# Patient Record
Sex: FEMALE | Race: BLACK OR AFRICAN AMERICAN | Employment: FULL TIME | URBAN - METROPOLITAN AREA
[De-identification: names, ages, dates, MRNs, and addresses within clinical notes are randomized per-mention and may not be internally consistent; named-entity substitution may affect disease eponyms.]

---

## 2019-01-26 ENCOUNTER — HOSPITAL ENCOUNTER (EMERGENCY)
Age: 24
Discharge: HOME | End: 2019-01-26
Attending: EMERGENCY MEDICINE
Payer: COMMERCIAL

## 2019-01-26 VITALS
TEMPERATURE: 99 F | RESPIRATION RATE: 16 BRPM | HEART RATE: 105 BPM | OXYGEN SATURATION: 100 % | HEIGHT: 66 IN | WEIGHT: 200 LBS | BODY MASS INDEX: 32.14 KG/M2 | SYSTOLIC BLOOD PRESSURE: 131 MMHG | DIASTOLIC BLOOD PRESSURE: 67 MMHG

## 2019-01-26 DIAGNOSIS — J03.90 ACUTE TONSILLITIS, UNSPECIFIED ETIOLOGY: Primary | ICD-10-CM

## 2019-01-26 LAB — S PYO AG THROAT QL: NEGATIVE

## 2019-01-26 PROCEDURE — 87880 STREP A ASSAY W/OPTIC: CPT

## 2019-01-26 PROCEDURE — 87081 CULTURE SCREEN ONLY: CPT

## 2019-01-26 PROCEDURE — 99282 EMERGENCY DEPT VISIT SF MDM: CPT

## 2019-01-26 RX ORDER — CEPHALEXIN 500 MG/1
500 CAPSULE ORAL 4 TIMES DAILY
Qty: 40 CAPSULE | Refills: 0 | Status: SHIPPED | OUTPATIENT
Start: 2019-01-26 | End: 2019-02-05

## 2019-01-29 LAB — S PYO THROAT QL CULT: NORMAL

## 2020-12-31 ENCOUNTER — OFFICE VISIT (OUTPATIENT)
Dept: PRIMARY CARE CLINIC | Age: 25
End: 2020-12-31
Payer: COMMERCIAL

## 2020-12-31 VITALS
OXYGEN SATURATION: 98 % | WEIGHT: 214.4 LBS | HEIGHT: 67 IN | DIASTOLIC BLOOD PRESSURE: 80 MMHG | TEMPERATURE: 97.8 F | HEART RATE: 90 BPM | BODY MASS INDEX: 33.65 KG/M2 | SYSTOLIC BLOOD PRESSURE: 120 MMHG

## 2020-12-31 DIAGNOSIS — Z00.00 ENCOUNTER FOR ANNUAL PHYSICAL EXAM: ICD-10-CM

## 2020-12-31 LAB
BILIRUBIN, POC: NEGATIVE
BLOOD URINE, POC: NEGATIVE
CLARITY, POC: NORMAL
COLOR, POC: YELLOW
CONTROL: NORMAL
GLUCOSE URINE, POC: NEGATIVE
KETONES, POC: NORMAL
LEUKOCYTE EST, POC: NEGATIVE
NITRITE, POC: NEGATIVE
PH, POC: 5.5
PREGNANCY TEST URINE, POC: NEGATIVE
PROTEIN, POC: NORMAL
SPECIFIC GRAVITY, POC: 1
UROBILINOGEN, POC: 0.2

## 2020-12-31 PROCEDURE — 99385 PREV VISIT NEW AGE 18-39: CPT | Performed by: STUDENT IN AN ORGANIZED HEALTH CARE EDUCATION/TRAINING PROGRAM

## 2020-12-31 PROCEDURE — 81025 URINE PREGNANCY TEST: CPT | Performed by: STUDENT IN AN ORGANIZED HEALTH CARE EDUCATION/TRAINING PROGRAM

## 2020-12-31 PROCEDURE — 81002 URINALYSIS NONAUTO W/O SCOPE: CPT | Performed by: STUDENT IN AN ORGANIZED HEALTH CARE EDUCATION/TRAINING PROGRAM

## 2020-12-31 SDOH — HEALTH STABILITY: MENTAL HEALTH: HOW OFTEN DO YOU HAVE A DRINK CONTAINING ALCOHOL?: NOT ASKED

## 2020-12-31 SDOH — ECONOMIC STABILITY: INCOME INSECURITY: HOW HARD IS IT FOR YOU TO PAY FOR THE VERY BASICS LIKE FOOD, HOUSING, MEDICAL CARE, AND HEATING?: NOT HARD AT ALL

## 2020-12-31 SDOH — ECONOMIC STABILITY: TRANSPORTATION INSECURITY
IN THE PAST 12 MONTHS, HAS THE LACK OF TRANSPORTATION KEPT YOU FROM MEDICAL APPOINTMENTS OR FROM GETTING MEDICATIONS?: NO

## 2020-12-31 SDOH — ECONOMIC STABILITY: TRANSPORTATION INSECURITY
IN THE PAST 12 MONTHS, HAS LACK OF TRANSPORTATION KEPT YOU FROM MEETINGS, WORK, OR FROM GETTING THINGS NEEDED FOR DAILY LIVING?: NO

## 2020-12-31 SDOH — ECONOMIC STABILITY: FOOD INSECURITY: WITHIN THE PAST 12 MONTHS, THE FOOD YOU BOUGHT JUST DIDN'T LAST AND YOU DIDN'T HAVE MONEY TO GET MORE.: NEVER TRUE

## 2020-12-31 SDOH — HEALTH STABILITY: MENTAL HEALTH: HOW MANY STANDARD DRINKS CONTAINING ALCOHOL DO YOU HAVE ON A TYPICAL DAY?: NOT ASKED

## 2020-12-31 SDOH — ECONOMIC STABILITY: FOOD INSECURITY: WITHIN THE PAST 12 MONTHS, YOU WORRIED THAT YOUR FOOD WOULD RUN OUT BEFORE YOU GOT MONEY TO BUY MORE.: NEVER TRUE

## 2020-12-31 ASSESSMENT — PATIENT HEALTH QUESTIONNAIRE - PHQ9
SUM OF ALL RESPONSES TO PHQ QUESTIONS 1-9: 0
1. LITTLE INTEREST OR PLEASURE IN DOING THINGS: 0
SUM OF ALL RESPONSES TO PHQ QUESTIONS 1-9: 0
2. FEELING DOWN, DEPRESSED OR HOPELESS: 0
SUM OF ALL RESPONSES TO PHQ QUESTIONS 1-9: 0
SUM OF ALL RESPONSES TO PHQ9 QUESTIONS 1 & 2: 0

## 2020-12-31 NOTE — PROGRESS NOTES
2020    Kriss Perez (:  1995) is a 31 y.o. female, here for evaluation of the following medical concerns:    HPI    Abdominal pain x 3 months  30 minutes after eating  No bloating, no gas  2 BM normal daily  No vomiting  Has history of endometriosis    Has been feeling abdominal pain for about a year ago like she had before with the endometriosis. Normal BM's, having some urinary frequency, no abnormal vaginal, no hematuria, fevers, no abnormal pap smear in past, no discharge. Social Hx:  no tobacco/alcohol/drugs  Sexually active: men no condoms  Occupation: car parts   Fam Hx: none  Past Surgical Hx:  Other PMH: endometriosis, 2 years ago 2018  Menses: irregular, will have some spotting in between periods lasting 3 days, no pain or heavy bleeding  Contraception: no   Pap Hx: Last pap: no abnormals per pt    LMP 12/10    Review of Systems   Constitutional: Negative for chills and fever. HENT: Negative for congestion, rhinorrhea and sore throat. Eyes: Negative for visual disturbance. Respiratory: Negative for cough and shortness of breath. Cardiovascular: Negative for chest pain. Gastrointestinal: Negative for constipation, diarrhea, nausea and vomiting. Endocrine: Negative for polydipsia and polyuria. Genitourinary: Negative for dysuria. Musculoskeletal: Negative for arthralgias. Skin: Negative for rash. Allergic/Immunologic: Negative for environmental allergies. Neurological: Negative for headaches. Psychiatric/Behavioral: The patient is not nervous/anxious. Prior to Visit Medications    Not on File        No Known Allergies    No past medical history on file. No past surgical history on file.     Social History     Socioeconomic History    Marital status: Single     Spouse name: Not on file    Number of children: Not on file    Years of education: Not on file    Highest education level: Not on file   Occupational History    Not on normal. No rhinorrhea. Mouth/Throat:      Mouth: Mucous membranes are moist.      Pharynx: Oropharynx is clear. No oropharyngeal exudate. Eyes:      General: No scleral icterus. Right eye: No discharge. Left eye: No discharge. Extraocular Movements: Extraocular movements intact. Conjunctiva/sclera: Conjunctivae normal.   Neck:      Musculoskeletal: Neck supple. No muscular tenderness. Cardiovascular:      Rate and Rhythm: Normal rate and regular rhythm. Pulses: Normal pulses. Heart sounds: Normal heart sounds. No murmur. No gallop. Pulmonary:      Effort: Pulmonary effort is normal.      Breath sounds: Normal breath sounds. No wheezing, rhonchi or rales. Abdominal:      General: Abdomen is flat. Bowel sounds are normal. There is no distension. Palpations: Abdomen is soft. There is no mass. Comments: Moderate tenderness palpation periumbilical region, no guarding, no rebound   Genitourinary:     General: Normal vulva. Vagina: Normal.      Cervix: Normal.      Uterus: Normal.       Adnexa: Right adnexa normal and left adnexa normal.   Musculoskeletal: Normal range of motion. Lymphadenopathy:      Cervical: No cervical adenopathy. Skin:     General: Skin is warm. Capillary Refill: Capillary refill takes less than 2 seconds. Findings: No rash. Neurological:      General: No focal deficit present. Mental Status: She is alert. Cranial Nerves: No cranial nerve deficit. Psychiatric:         Mood and Affect: Mood normal.         Behavior: Behavior normal.         ASSESSMENT/PLAN:    Get CT scan of her abdomen since she is has moderate periumbilical pain, unsure if this is an exacerbation of her endometriosis? Will send to gynecology for evaluation. 1. Encounter for annual physical exam  - HEPATITIS C ANTIBODY; Future  - HIV-1 AND HIV-2 ANTIBODIES; Future  - PAP SMEAR  - COMPREHENSIVE METABOLIC PANEL;  Future  - C.trachomatis N.gonorrhoeae DNA, Urine; Future  - C.trachomatis N.gonorrhoeae DNA, Urine    2. Left inguinal pain  - VAGINAL PATHOGENS PROBE *A  - POCT Urinalysis no Micro  - [facility]    3. Hx of endometriosis  - [facility]    4. Periumbilical abdominal pain  - CT ABDOMEN PELVIS W IV CONTRAST Additional Contrast? None; Future  - POCT urine pregnancy      Return if symptoms worsen or fail to improve. An  electronic signature was used to authenticate this note.     --Mildred Castleman, MD on 12/31/2020 at 6:12 PM

## 2021-01-01 LAB
A/G RATIO: 1.7 (ref 1.1–2.2)
ALBUMIN SERPL-MCNC: 4.7 G/DL (ref 3.4–5)
ALP BLD-CCNC: 73 U/L (ref 40–129)
ALT SERPL-CCNC: 16 U/L (ref 10–40)
ANION GAP SERPL CALCULATED.3IONS-SCNC: 13 MMOL/L (ref 3–16)
AST SERPL-CCNC: 15 U/L (ref 15–37)
BILIRUB SERPL-MCNC: <0.2 MG/DL (ref 0–1)
BUN BLDV-MCNC: 15 MG/DL (ref 7–20)
CALCIUM SERPL-MCNC: 9.8 MG/DL (ref 8.3–10.6)
CANDIDA SPECIES, DNA PROBE: ABNORMAL
CHLORIDE BLD-SCNC: 103 MMOL/L (ref 99–110)
CO2: 24 MMOL/L (ref 21–32)
CREAT SERPL-MCNC: 0.6 MG/DL (ref 0.6–1.1)
GARDNERELLA VAGINALIS, DNA PROBE: ABNORMAL
GFR AFRICAN AMERICAN: >60
GFR NON-AFRICAN AMERICAN: >60
GLOBULIN: 2.8 G/DL
GLUCOSE BLD-MCNC: 65 MG/DL (ref 70–99)
HEPATITIS C ANTIBODY INTERPRETATION: NORMAL
HIV AG/AB: NORMAL
HIV ANTIGEN: NORMAL
HIV-1 ANTIBODY: NORMAL
HIV-2 AB: NORMAL
POTASSIUM SERPL-SCNC: 5.5 MMOL/L (ref 3.5–5.1)
SODIUM BLD-SCNC: 140 MMOL/L (ref 136–145)
TOTAL PROTEIN: 7.5 G/DL (ref 6.4–8.2)
TRICHOMONAS VAGINALIS DNA: ABNORMAL

## 2021-01-01 RX ORDER — METRONIDAZOLE 500 MG/1
500 TABLET ORAL 2 TIMES DAILY
Qty: 14 TABLET | Refills: 0 | Status: SHIPPED | OUTPATIENT
Start: 2021-01-01 | End: 2021-01-08

## 2021-01-01 NOTE — RESULT ENCOUNTER NOTE
Affirm positive for bacterial vaginosis. This is not an STD but can happen when their is a disruption in the pH of the vagina allowing the bad bacteria to overpower the good bacteria. Avoiding things like scented bath soaps, scented tampons, vaginal douche, and maintain good hygiene especially after intercourse, will help. It should be treated with antibiotics because it can increase your risk for acquiring STDs. I will send medicine to your pharmacy. Any questions, please feel free to message or call me back.     Sincerely,   Dr Biju Henriquez

## 2021-01-03 LAB
C. TRACHOMATIS DNA ,URINE: NEGATIVE
N. GONORRHOEAE DNA, URINE: NEGATIVE

## 2021-01-13 ENCOUNTER — TELEPHONE (OUTPATIENT)
Dept: PRIMARY CARE CLINIC | Age: 26
End: 2021-01-13

## 2021-01-13 ENCOUNTER — HOSPITAL ENCOUNTER (OUTPATIENT)
Dept: CT IMAGING | Age: 26
Discharge: HOME | End: 2021-01-13
Payer: COMMERCIAL

## 2021-01-13 DIAGNOSIS — R10.33 PERIUMBILICAL ABDOMINAL PAIN: ICD-10-CM

## 2021-01-13 PROCEDURE — 6360000004 HC RX CONTRAST MEDICATION: Performed by: STUDENT IN AN ORGANIZED HEALTH CARE EDUCATION/TRAINING PROGRAM

## 2021-01-13 PROCEDURE — 74177 CT ABD & PELVIS W/CONTRAST: CPT

## 2021-01-13 RX ADMIN — IOPAMIDOL 75 ML: 755 INJECTION, SOLUTION INTRAVENOUS at 14:56

## 2021-01-14 ENCOUNTER — OFFICE VISIT (OUTPATIENT)
Dept: GYNECOLOGY | Age: 26
End: 2021-01-14
Payer: COMMERCIAL

## 2021-01-14 VITALS
SYSTOLIC BLOOD PRESSURE: 115 MMHG | HEART RATE: 76 BPM | TEMPERATURE: 97.1 F | WEIGHT: 217 LBS | BODY MASS INDEX: 33.79 KG/M2 | DIASTOLIC BLOOD PRESSURE: 75 MMHG

## 2021-01-14 DIAGNOSIS — G89.29 CHRONIC PELVIC PAIN IN FEMALE: ICD-10-CM

## 2021-01-14 DIAGNOSIS — N80.9 ENDOMETRIOSIS: Primary | ICD-10-CM

## 2021-01-14 DIAGNOSIS — R10.2 CHRONIC PELVIC PAIN IN FEMALE: ICD-10-CM

## 2021-01-14 DIAGNOSIS — D21.9 FIBROID: ICD-10-CM

## 2021-01-14 PROCEDURE — 99203 OFFICE O/P NEW LOW 30 MIN: CPT | Performed by: OBSTETRICS & GYNECOLOGY

## 2021-01-14 RX ORDER — LEUPROLIDE ACETATE 3.75 MG
3.75 KIT INTRAMUSCULAR ONCE
Qty: 1 KIT | Refills: 0 | Status: SHIPPED | OUTPATIENT
Start: 2021-01-14 | End: 2022-10-05 | Stop reason: ALTCHOICE

## 2021-01-14 ASSESSMENT — ENCOUNTER SYMPTOMS
DIARRHEA: 0
ABDOMINAL PAIN: 0
CHEST TIGHTNESS: 0
CONSTIPATION: 0
BLOOD IN STOOL: 0
SHORTNESS OF BREATH: 0
SORE THROAT: 0
NAUSEA: 0
APNEA: 0
WHEEZING: 0
TROUBLE SWALLOWING: 0
BACK PAIN: 0
COUGH: 0
ABDOMINAL DISTENTION: 0
PHOTOPHOBIA: 0
COLOR CHANGE: 0
ANAL BLEEDING: 0
RECTAL PAIN: 0
VOMITING: 0

## 2021-01-14 NOTE — PROGRESS NOTES
Annual GYN Visit    Kashif Casey  Date ofBirth:  1995    Date of Service:  2021    Chief Complaint:   Kashif Casey is a 31 y.o.  ab1  female who presents for history of endometriosis, chronic pelvic pain x 2 years and fibroids     HPI:  Patient is premenopausal- she had a DIAGNOSTIC LAPAROSCOPY WITH LASER vaporization of endometrial implants, DILATION AND CURETTAGE, MYOSURE, HYSTEROSCOPY COLPOSCOPY WITH CERVICAL BIOPSY; chromo pertubation at [facility] in 2018 and noted to have ZAINAB 1, endometriosis, endometrial polyp and patent left fallopian tube and non patent right fallopian tube. She is here for chronic pelvic pain -pain is mainly in the left lower abdomen and worse right before and after menses - typically symptoms last for 2 weeks in a month, she took oral contraception last year for 2 months for endometriosis but it did not alleviate symptoms and she had issues with compliance. Patient is in a monogamous relationship x 3 year, had std screens  2020 - negative. She has a pap smear done on 2020 - normal . She wants to try to conceive in one year. She had a CT scan abdomen and pelvis done 2021 ->   FINDINGS:   Lower Chest: There is no consolidation or effusion.       Organs: The liver, pancreas, spleen, kidneys and adrenals are unremarkable.       GI/Bowel: There is no bowel dilatation, wall thickening or obstruction.  The   appendix is normal.       Pelvis: The bladder is decompressed and thus not evaluated.  A small   intramural fibroid is noted within the posterior uterine fundus, exhibiting   diameter of 1 cm.       Peritoneum/Retroperitoneum: There is no free air, free fluid or   intraperitoneal inflammatory change.  There is no adenopathy.       Bones/Soft Tissues: There is no acute fracture or aggressive osseous lesion.           Impression   Small uterine fibroid, otherwise negative study.      Menses are regular with normal flow and last 3 days since surgery, previously 10 days   Health Maintenance   Topic Date Due    Varicella vaccine (1 of 2 - 2-dose childhood series) 04/20/1995    HPV vaccine (1 - 2-dose series) 04/20/2005    DTaP/Tdap/Td vaccine (1 - Tdap) 04/20/2013    Flu vaccine (1) 12/31/2021 (Originally 9/1/2020)    Cervical cancer screen  12/31/2023    Hepatitis C screen  Completed    HIV screen  Completed    Hepatitis A vaccine  Aged Out    Hepatitis B vaccine  Aged Out    Hib vaccine  Aged Out    Meningococcal (ACWY) vaccine  Aged Out    Pneumococcal 0-64 years Vaccine  Aged Out       No past medical history on file. No past surgical history on file. OB History   No obstetric history on file. Social History     Socioeconomic History    Marital status: Single     Spouse name: Not on file    Number of children: Not on file    Years of education: Not on file    Highest education level: Not on file   Occupational History    Not on file   Social Needs    Financial resource strain: Not hard at all    Food insecurity     Worry: Never true     Inability: Never true   [employer] needs     Medical: No     Non-medical: No   Tobacco Use    Smoking status: Never Smoker    Smokeless tobacco: Never Used   Substance and Sexual Activity    Alcohol use:  Yes    Drug use: No    Sexual activity: Not on file   Lifestyle    Physical activity     Days per week: Not on file     Minutes per session: Not on file    Stress: Not on file   Relationships    Social connections     Talks on phone: Not on file     Gets together: Not on file     Attends Religion service: Not on file     Active member of club or organization: Not on file     Attends meetings of clubs or organizations: Not on file     Relationship status: Not on file    Intimate partner violence     Fear of current or ex partner: Not on file     Emotionally abused: Not on file     Physically abused: Not on file     Forced sexual activity: Not on file   Other Topics Concern    Not on file   Social History Narrative    Not on file     No Known Allergies  Outpatient Medications Marked as Taking for the 1/14/21 encounter (Office Visit) with Chris Patterson MD   Medication Sig Dispense Refill    leuprolide (LUPRON DEPOT, 1-MONTH,) 3.75 MG injection Inject 3.75 mg into the muscle once for 1 dose Bring medication to doctor office for administration 1 kit 0     No family history on file. Review of Systems:  Review of Systems   Constitutional: Negative for appetite change, chills, fatigue, fever and unexpected weight change. HENT: Negative for ear pain, hearing loss, mouth sores, sore throat and trouble swallowing. Eyes: Negative for photophobia and visual disturbance. Respiratory: Negative for apnea, cough, chest tightness, shortness of breath and wheezing. Cardiovascular: Negative for chest pain, palpitations and leg swelling. Gastrointestinal: Negative for abdominal distention, abdominal pain, anal bleeding, blood in stool, constipation, diarrhea, nausea, rectal pain and vomiting. Endocrine: Negative for cold intolerance, heat intolerance, polydipsia, polyphagia and polyuria. Genitourinary: Positive for pelvic pain. Negative for difficulty urinating, dyspareunia, dysuria, enuresis, frequency, genital sores, hematuria, menstrual problem, urgency, vaginal bleeding, vaginal discharge and vaginal pain. Musculoskeletal: Negative for arthralgias, back pain, joint swelling and myalgias. Skin: Negative for color change and rash. Neurological: Negative for dizziness, seizures, syncope, light-headedness and headaches. Psychiatric/Behavioral: Negative for agitation, behavioral problems, confusion, decreased concentration, dysphoric mood, hallucinations, self-injury, sleep disturbance and suicidal ideas. The patient is not nervous/anxious and is not hyperactive.         Physical Exam:  /75   Pulse 76   Temp 97.1 °F (36.2 °C)   Wt 217 lb (98.4 kg) BMI 33.79 kg/m²   BP Readings from Last 3 Encounters:   01/14/21 115/75   12/31/20 120/80   01/26/19 131/67     Body mass index is 33.79 kg/m². Physical Exam  Constitutional:       General: She is not in acute distress. Appearance: She is well-developed. HENT:      Head: Normocephalic and atraumatic. Mouth/Throat:      Pharynx: No oropharyngeal exudate. Eyes:      General: No scleral icterus. Right eye: No discharge. Left eye: No discharge. Conjunctiva/sclera: Conjunctivae normal.   Neck:      Thyroid: No thyromegaly. Cardiovascular:      Rate and Rhythm: Normal rate and regular rhythm. Heart sounds: Normal heart sounds. Pulmonary:      Effort: Pulmonary effort is normal.      Breath sounds: Normal breath sounds. Abdominal:      General: Bowel sounds are normal. There is no distension. Palpations: Abdomen is soft. There is no mass. Tenderness: There is no abdominal tenderness. There is no guarding or rebound. Genitourinary:     Labia:         Right: No rash, tenderness, lesion or injury. Left: No rash, tenderness, lesion or injury. Vagina: Normal. No signs of injury and foreign body. No vaginal discharge, erythema or tenderness. Cervix: No cervical motion tenderness, discharge or friability. Uterus: Not deviated, not enlarged, not fixed and not tender. Adnexa:         Right: No mass, tenderness or fullness. Left: No mass, tenderness or fullness. Rectum: No mass or external hemorrhoid. Skin:     General: Skin is warm. Coloration: Skin is not pale. Findings: No erythema or rash. Neurological:      Mental Status: She is alert. Psychiatric:         Behavior: Behavior normal. Behavior is cooperative. Thought Content: Thought content normal.         Judgment: Judgment normal.           Assessment/Plan:  1.  Endometriosis  Discussed all treatment options including medical and surgical treatments - she wants to try lupron - risks, benefits and alternative to lupron discussed including side-effects   - US PELVIS COMPLETE; Future  - leuprolide (LUPRON DEPOT, 1-MONTH,) 3.75 MG injection; Inject 3.75 mg into the muscle once for 1 dose Bring medication to doctor office for administration  Dispense: 1 kit; Refill: 0    2. Fibroid  - US PELVIS COMPLETE; Future    3. Chronic pelvic pain in female  Secondary to endometriosis   - US PELVIS COMPLETE; Future      Return in about 1 week (around 1/21/2021).

## 2021-01-15 ENCOUNTER — HOSPITAL ENCOUNTER (OUTPATIENT)
Dept: ULTRASOUND IMAGING | Age: 26
Discharge: HOME | End: 2021-01-15
Payer: COMMERCIAL

## 2021-01-15 DIAGNOSIS — G89.29 CHRONIC PELVIC PAIN IN FEMALE: ICD-10-CM

## 2021-01-15 DIAGNOSIS — D21.9 FIBROID: ICD-10-CM

## 2021-01-15 DIAGNOSIS — N80.9 ENDOMETRIOSIS: ICD-10-CM

## 2021-01-15 DIAGNOSIS — R10.2 CHRONIC PELVIC PAIN IN FEMALE: ICD-10-CM

## 2021-01-15 PROCEDURE — 76856 US EXAM PELVIC COMPLETE: CPT

## 2021-01-26 ENCOUNTER — PATIENT MESSAGE (OUTPATIENT)
Dept: GYNECOLOGY | Age: 26
End: 2021-01-26

## 2021-01-28 NOTE — TELEPHONE ENCOUNTER
Our department does not do PA's for your offfice. I am unsure how you got this information. We only do PCP; not specialty.

## 2021-01-29 ENCOUNTER — TELEPHONE (OUTPATIENT)
Dept: GYNECOLOGY | Age: 26
End: 2021-01-29

## 2021-01-29 NOTE — TELEPHONE ENCOUNTER
Please do PA FOR LUPRON for treatment of endometriosis and notify patient with an update.  Thank you

## 2021-01-29 NOTE — TELEPHONE ENCOUNTER
Reference # [id number]  P. A was done through medical insurance approval pending review notification will be sent via fax to [phone] Dr. Brooklyn Hamilton office.

## 2021-02-22 ENCOUNTER — TELEPHONE (OUTPATIENT)
Dept: GYNECOLOGY | Age: 26
End: 2021-02-22

## 2021-02-22 NOTE — TELEPHONE ENCOUNTER
Spoke to patient she was notified that depot was approved and she could bring it to office and make appt

## 2022-05-16 LAB
ABO, EXTERNAL RESULT: NORMAL
HEP B, EXTERNAL RESULT: NEGATIVE
HEPATITIS C ANTIBODY, EXTERNAL RESULT: NEGATIVE
HIV, EXTERNAL RESULT: NON REACTIVE
RH FACTOR, EXTERNAL RESULT: POSITIVE
RPR, EXTERNAL RESULT: NORMAL
RUBELLA TITER, EXTERNAL RESULT: NORMAL

## 2022-08-05 ENCOUNTER — HOSPITAL ENCOUNTER (EMERGENCY)
Age: 27
Discharge: HOME | End: 2022-08-06
Payer: MEDICAID

## 2022-08-05 VITALS
SYSTOLIC BLOOD PRESSURE: 149 MMHG | BODY MASS INDEX: 37.67 KG/M2 | HEART RATE: 96 BPM | WEIGHT: 240 LBS | OXYGEN SATURATION: 100 % | TEMPERATURE: 98.7 F | RESPIRATION RATE: 20 BRPM | HEIGHT: 67 IN | DIASTOLIC BLOOD PRESSURE: 68 MMHG

## 2022-08-05 DIAGNOSIS — N30.00 ACUTE CYSTITIS WITHOUT HEMATURIA: ICD-10-CM

## 2022-08-05 DIAGNOSIS — O26.892 ABDOMINAL PAIN IN PREGNANCY, SECOND TRIMESTER: Primary | ICD-10-CM

## 2022-08-05 DIAGNOSIS — R10.9 ABDOMINAL PAIN IN PREGNANCY, SECOND TRIMESTER: Primary | ICD-10-CM

## 2022-08-05 PROCEDURE — 99283 EMERGENCY DEPT VISIT LOW MDM: CPT

## 2022-08-05 RX ORDER — ACETAMINOPHEN 500 MG
1000 TABLET ORAL ONCE
Status: COMPLETED | OUTPATIENT
Start: 2022-08-06 | End: 2022-08-06

## 2022-08-06 LAB
A/G RATIO: 1.2 (ref 1.1–2.2)
ALBUMIN SERPL-MCNC: 3.6 G/DL (ref 3.4–5)
ALP BLD-CCNC: 76 U/L (ref 40–129)
ALT SERPL-CCNC: 27 U/L (ref 10–40)
ANION GAP SERPL CALCULATED.3IONS-SCNC: 8 MMOL/L (ref 3–16)
AST SERPL-CCNC: 17 U/L (ref 15–37)
BACTERIA: ABNORMAL /HPF
BASOPHILS ABSOLUTE: 0.1 K/UL (ref 0–0.2)
BASOPHILS RELATIVE PERCENT: 0.4 %
BILIRUB SERPL-MCNC: <0.2 MG/DL (ref 0–1)
BILIRUBIN URINE: NEGATIVE
BLOOD, URINE: NEGATIVE
BUN BLDV-MCNC: 6 MG/DL (ref 7–20)
CALCIUM SERPL-MCNC: 9.4 MG/DL (ref 8.3–10.6)
CHLORIDE BLD-SCNC: 104 MMOL/L (ref 99–110)
CLARITY: ABNORMAL
CO2: 23 MMOL/L (ref 21–32)
COLOR: YELLOW
CREAT SERPL-MCNC: 0.5 MG/DL (ref 0.6–1.1)
EOSINOPHILS ABSOLUTE: 0.1 K/UL (ref 0–0.6)
EOSINOPHILS RELATIVE PERCENT: 0.7 %
EPITHELIAL CELLS, UA: 15 /HPF (ref 0–5)
GFR AFRICAN AMERICAN: >60
GFR NON-AFRICAN AMERICAN: >60
GLUCOSE BLD-MCNC: 114 MG/DL (ref 70–99)
GLUCOSE URINE: 500 MG/DL
HCT VFR BLD CALC: 33.8 % (ref 36–48)
HEMOGLOBIN: 11 G/DL (ref 12–16)
HYALINE CASTS: 0 /LPF (ref 0–8)
KETONES, URINE: NEGATIVE MG/DL
LEUKOCYTE ESTERASE, URINE: ABNORMAL
LYMPHOCYTES ABSOLUTE: 2.1 K/UL (ref 1–5.1)
LYMPHOCYTES RELATIVE PERCENT: 17.5 %
MCH RBC QN AUTO: 27.1 PG (ref 26–34)
MCHC RBC AUTO-ENTMCNC: 32.5 G/DL (ref 31–36)
MCV RBC AUTO: 83.5 FL (ref 80–100)
MICROSCOPIC EXAMINATION: YES
MONOCYTES ABSOLUTE: 1 K/UL (ref 0–1.3)
MONOCYTES RELATIVE PERCENT: 8.3 %
NEUTROPHILS ABSOLUTE: 8.8 K/UL (ref 1.7–7.7)
NEUTROPHILS RELATIVE PERCENT: 73.1 %
NITRITE, URINE: NEGATIVE
PDW BLD-RTO: 17.2 % (ref 12.4–15.4)
PH UA: 7.5 (ref 5–8)
PLATELET # BLD: 327 K/UL (ref 135–450)
PMV BLD AUTO: 7.3 FL (ref 5–10.5)
POTASSIUM SERPL-SCNC: 3.7 MMOL/L (ref 3.5–5.1)
PROTEIN UA: NEGATIVE MG/DL
RBC # BLD: 4.05 M/UL (ref 4–5.2)
RBC UA: 0 /HPF (ref 0–4)
SODIUM BLD-SCNC: 135 MMOL/L (ref 136–145)
SPECIFIC GRAVITY UA: 1.01 (ref 1–1.03)
TOTAL PROTEIN: 6.7 G/DL (ref 6.4–8.2)
URINE REFLEX TO CULTURE: YES
URINE TYPE: ABNORMAL
UROBILINOGEN, URINE: 0.2 E.U./DL
WBC # BLD: 12 K/UL (ref 4–11)
WBC UA: 19 /HPF (ref 0–5)

## 2022-08-06 PROCEDURE — 87086 URINE CULTURE/COLONY COUNT: CPT

## 2022-08-06 PROCEDURE — 85025 COMPLETE CBC W/AUTO DIFF WBC: CPT

## 2022-08-06 PROCEDURE — 81001 URINALYSIS AUTO W/SCOPE: CPT

## 2022-08-06 PROCEDURE — 36415 COLL VENOUS BLD VENIPUNCTURE: CPT

## 2022-08-06 PROCEDURE — 80053 COMPREHEN METABOLIC PANEL: CPT

## 2022-08-06 PROCEDURE — 6370000000 HC RX 637 (ALT 250 FOR IP): Performed by: PHYSICIAN ASSISTANT

## 2022-08-06 RX ORDER — CEPHALEXIN 500 MG/1
500 CAPSULE ORAL 2 TIMES DAILY
Qty: 14 CAPSULE | Refills: 0 | Status: SHIPPED | OUTPATIENT
Start: 2022-08-06 | End: 2022-08-13

## 2022-08-06 RX ORDER — CEPHALEXIN 250 MG/1
500 CAPSULE ORAL ONCE
Status: COMPLETED | OUTPATIENT
Start: 2022-08-06 | End: 2022-08-06

## 2022-08-06 RX ADMIN — ACETAMINOPHEN 1000 MG: 500 TABLET ORAL at 00:33

## 2022-08-06 RX ADMIN — CEPHALEXIN 500 MG: 250 CAPSULE ORAL at 02:11

## 2022-08-06 ASSESSMENT — PAIN SCALES - GENERAL: PAINLEVEL_OUTOF10: 7

## 2022-08-06 ASSESSMENT — ENCOUNTER SYMPTOMS
VOMITING: 0
COUGH: 0
DIARRHEA: 0
SHORTNESS OF BREATH: 0
ABDOMINAL PAIN: 1
RHINORRHEA: 0
NAUSEA: 0

## 2022-08-06 NOTE — ED PROVIDER NOTES
[address] [facility]        Pt Name: Joey Adams  MRN: [id number]  [place] 1995  Date of evaluation: 8/5/2022  Provider: Lissette Mccarty PA-C  PCP: Jen Peralta MD  Note Started: 2:39 AM EDT       GOLDEN. I have evaluated this patient. My supervising physician was available for consultation. CHIEF COMPLAINT       Chief Complaint   Patient presents with    Abdominal Cramping     Patient states she is 19 weeks pregnant and for the last three days has been having sever abdominal cramping as well as flank pain. Patient denies any vaginal bleeding. HISTORY OF PRESENT ILLNESS   (Location, Timing/Onset, Context/Setting, Quality, Duration, Modifying Factors, Severity, Associated Signs and Symptoms)  Note limiting factors. Chief Complaint: Abdominal cramping in pregnancy    Joey Adams is a 28 y.o. female who presents to the emergency department today for evaluation for abdominal cramping in pregnancy. The patient states that her due date is in January, she states that she is 18 weeks, 5 days pregnant. Patient states that for the past 3 days she has been experiencing intermittent cramping throughout her lower abdomen. Patient states that the cramping seems to wax and wane on its own, she denies any known alleviating or rating factors. As she states that she has not yet felt the baby move during this pregnancy. She denies any vaginal discharge, bleeding or loss of fluid. Patient has no concern for STDs. She has no fever or chills. She has no nausea, vomiting or diarrhea. She has no dysuria hematuria. She has no chest pain or shortness of breath. Patient otherwise has no other complaints at this time, she is followed by her OB, and actually has an appointment next week. G1, P0    Nursing Notes were all reviewed and agreed with or any disagreements were addressed in the HPI.     REVIEW OF SYSTEMS    (2-9 systems for atraumatic. Right Ear: External ear normal.      Left Ear: External ear normal.      Nose: Nose normal.   Eyes:      General:         Right eye: No discharge. Left eye: No discharge. Neck:      Trachea: No tracheal deviation. Cardiovascular:      Rate and Rhythm: Normal rate and regular rhythm. Heart sounds: No murmur heard. Pulmonary:      Effort: Pulmonary effort is normal. No respiratory distress. Breath sounds: Normal breath sounds. No wheezing or rales. Abdominal:      General: Bowel sounds are normal. There is no distension. Palpations: Abdomen is soft. Tenderness: There is no abdominal tenderness. There is no right CVA tenderness, left CVA tenderness or guarding. Comments: Gravid uterus, with the fundus palpated just below the level of the umbilicus. Abdomen is otherwise benign. There is no CVA tenderness or flank tenderness. Musculoskeletal:         General: Normal range of motion. Cervical back: Normal range of motion and neck supple. Skin:     General: Skin is warm and dry. Neurological:      Mental Status: She is alert and oriented to person, place, and time.    Psychiatric:         Behavior: Behavior normal.       DIAGNOSTIC RESULTS   LABS:    Labs Reviewed   CBC WITH AUTO DIFFERENTIAL - Abnormal; Notable for the following components:       Result Value    WBC 12.0 (*)     Hemoglobin 11.0 (*)     Hematocrit 33.8 (*)     RDW 17.2 (*)     Neutrophils Absolute 8.8 (*)     All other components within normal limits   COMPREHENSIVE METABOLIC PANEL - Abnormal; Notable for the following components:    Sodium 135 (*)     Glucose 114 (*)     BUN 6 (*)     Creatinine 0.5 (*)     All other components within normal limits   URINALYSIS WITH REFLEX TO CULTURE - Abnormal; Notable for the following components:    Clarity, UA CLOUDY (*)     Glucose, Ur 500 (*)     Leukocyte Esterase, Urine LARGE (*)     All other components within normal limits   MICROSCOPIC URINALYSIS - Abnormal; Notable for the following components:    Bacteria, UA 4+ (*)     WBC, UA 19 (*)     Epithelial Cells, UA 15 (*)     All other components within normal limits   CULTURE, URINE       When ordered only abnormal lab results are displayed. All other labs were within normal range or not returned as of this dictation. EKG: When ordered, EKG's are interpreted by the Emergency Department Physician in the absence of a cardiologist.  Please see their note for interpretation of EKG. RADIOLOGY:   Non-plain film images such as CT, Ultrasound and MRI are read by the radiologist. Plain radiographic images are visualized and preliminarily interpreted by the ED Provider with the below findings:        Interpretation per the Radiologist below, if available at the time of this note:    No orders to display     No results found. PROCEDURES   Unless otherwise noted below, none     Procedures    CRITICAL CARE TIME       CONSULTS:  None      EMERGENCY DEPARTMENT COURSE and DIFFERENTIAL DIAGNOSIS/MDM:   Vitals:    Vitals:    08/05/22 2329   BP: (!) 149/68   Pulse: 96   Resp: 20   Temp: 98.7 °F (37.1 °C)   TempSrc: Oral   SpO2: 100%   Weight: 240 lb (108.9 kg)   Height: 5' 7\" (1.702 m)       Patient was given the following medications:  Medications   acetaminophen (TYLENOL) tablet 1,000 mg (1,000 mg Oral Given 8/6/22 0033)   cephALEXin (KEFLEX) capsule 500 mg (500 mg Oral Given 8/6/22 0211)         Is this patient to be included in the SEP-1 Core Measure due to severe sepsis or septic shock? No   Exclusion criteria - the patient is NOT to be included for SEP-1 Core Measure due to:  2+ SIRS criteria are not met    Briefly, this is a 79-year-old female who presents emergency department today for evaluation for intermittent abdominal cramping in pregnancy. Patient is approximately 18 weeks 5 days. She has not yet felt the baby move. Has had intermittent pain x3 days.   No discharge, bleeding loss of fluid American >60 >60    GFR African American >60 >60    Calcium 9.4 8.3 - 10.6 mg/dL    Total Protein 6.7 6.4 - 8.2 g/dL    Albumin 3.6 3.4 - 5.0 g/dL    Albumin/Globulin Ratio 1.2 1.1 - 2.2    Total Bilirubin <0.2 0.0 - 1.0 mg/dL    Alkaline Phosphatase 76 40 - 129 U/L    ALT 27 10 - 40 U/L    AST 17 15 - 37 U/L   Urinalysis with Reflex to Culture    Specimen: Urine   Result Value Ref Range    Color, UA Yellow Straw/Yellow    Clarity, UA CLOUDY (A) Clear    Glucose, Ur 500 (A) Negative mg/dL    Bilirubin Urine Negative Negative    Ketones, Urine Negative Negative mg/dL    Specific Gravity, UA 1.015 1.005 - 1.030    Blood, Urine Negative Negative    pH, UA 7.5 5.0 - 8.0    Protein, UA Negative Negative mg/dL    Urobilinogen, Urine 0.2 <2.0 E.U./dL    Nitrite, Urine Negative Negative    Leukocyte Esterase, Urine LARGE (A) Negative    Microscopic Examination YES     Urine Type Voided     Urine Reflex to Culture Yes    Microscopic Urinalysis   Result Value Ref Range    Bacteria, UA 4+ (A) None Seen /HPF    Hyaline Casts, UA 0 0 - 8 /LPF    WBC, UA 19 (H) 0 - 5 /HPF    RBC, UA 0 0 - 4 /HPF    Epithelial Cells, UA 15 (H) 0 - 5 /HPF         I estimate there is LOW risk acute appendicitis, acute cholecystitis, cholangitis, pancreatitis, diverticulitis, perforated viscus, perforated ulcer, colitis, C. diff colitis, ischemic colitis, bowel obstruction, acute dissection, thus I consider the discharge disposition reasonable. Also, there is no evidence or peritonitis, sepsis, or toxicity. Evita Melgoza and I have discussed the diagnosis and risks, and we agree with discharging home to follow-up with their primary doctor. We also discussed returning to the Emergency Department immediately if new or worsening symptoms occur. We have discussed the symptoms which are most concerning (e.g., bloody stool, fever, changing or worsening pain, vomiting) that necessitate immediate return. FINAL IMPRESSION      1.  Abdominal pain in pregnancy, second trimester    2. Acute cystitis without hematuria          DISPOSITION/PLAN   DISPOSITION Decision To Discharge 08/06/2022 02:13:24 AM      PATIENT REFERRED TO:  Your OB    In 1 week  as scheduled    [facility] Emergency Department  [address]  [address] [address]  [phone]    As needed      DISCHARGE MEDICATIONS:  Discharge Medication List as of 8/6/2022  2:09 AM        START taking these medications    Details   cephALEXin (KEFLEX) 500 MG capsule Take 1 capsule by mouth in the morning and 1 capsule before bedtime. Do all this for 7 days. , Disp-14 capsule, R-0Normal             DISCONTINUED MEDICATIONS:  Discharge Medication List as of 8/6/2022  2:09 AM                 (Please note that portions of this note were completed with a voice recognition program.  Efforts were made to edit the dictations but occasionally words are mis-transcribed.)    Chandler Cornelius PA-C (electronically signed)           Chandler Cornelius PA-C  08/06/22 0351

## 2022-08-06 NOTE — ED NOTES
Discharge and education instructions reviewed. Patient verbalized understanding, teach-back successful. Patient denied questions at this time. No acute distress noted. Patient instructed to follow-up as noted - return to emergency department if symptoms worsen. Patient verbalized understanding. Discharged per EDMD with discharged instructions.        Benitez Dumont RN  08/06/22 9310

## 2022-08-06 NOTE — ED NOTES
[facility] [address] [facility], [address] [place] [address]  08/06/22 7942

## 2022-08-07 LAB — URINE CULTURE, ROUTINE: NORMAL

## 2022-08-09 ENCOUNTER — TELEPHONE (OUTPATIENT)
Dept: PRIMARY CARE CLINIC | Age: 27
End: 2022-08-09

## 2022-08-09 NOTE — TELEPHONE ENCOUNTER
[facility] Transitions Initial Follow Up Call    Outreach made within 2 business days of discharge: No    Patient: Mercedes Short Patient : 1995   MRN: [id number]  Reason for Admission: There are no discharge diagnoses documented for the most recent discharge. Discharge Date: 22       Spoke with: left message for pt to call back. 7-14 days    Follow Up  No future appointments.     Armando Carroll, [address]

## 2022-10-05 ENCOUNTER — ROUTINE PRENATAL (OUTPATIENT)
Dept: PERINATAL CARE | Age: 27
End: 2022-10-05
Payer: MEDICAID

## 2022-10-05 VITALS
WEIGHT: 249.8 LBS | SYSTOLIC BLOOD PRESSURE: 128 MMHG | HEART RATE: 105 BPM | DIASTOLIC BLOOD PRESSURE: 71 MMHG | BODY MASS INDEX: 39.12 KG/M2

## 2022-10-05 DIAGNOSIS — Z03.73 FETAL ANOMALY SUSPECTED BUT NOT FOUND: Primary | ICD-10-CM

## 2022-10-05 DIAGNOSIS — Z3A.27 27 WEEKS GESTATION OF PREGNANCY: ICD-10-CM

## 2022-10-05 PROCEDURE — 76811 OB US DETAILED SNGL FETUS: CPT

## 2022-10-05 NOTE — PROGRESS NOTES
[facility]    The patient was seen for obstetric ultrasound. Please see the full ultrasound report under the Media tab.     Dorothea Live  10/5/2022

## 2022-10-14 LAB — GBS, EXTERNAL RESULT: NOT DETECTED

## 2022-10-20 ENCOUNTER — HOSPITAL ENCOUNTER (OUTPATIENT)
Age: 27
Setting detail: OBSERVATION
Discharge: HOME | End: 2022-10-21
Attending: OBSTETRICS & GYNECOLOGY | Admitting: OBSTETRICS & GYNECOLOGY
Payer: MEDICAID

## 2022-10-20 PROBLEM — O13.9 GESTATIONAL HYPERTENSION AFFECTING FIRST PREGNANCY: Status: ACTIVE | Noted: 2022-10-20

## 2022-10-20 LAB
A/G RATIO: 1.3 (ref 1.1–2.2)
ALBUMIN SERPL-MCNC: 3.8 G/DL (ref 3.4–5)
ALP BLD-CCNC: 119 U/L (ref 40–129)
ALT SERPL-CCNC: 14 U/L (ref 10–40)
AMPHETAMINE SCREEN, URINE: NORMAL
ANION GAP SERPL CALCULATED.3IONS-SCNC: 13 MMOL/L (ref 3–16)
AST SERPL-CCNC: 24 U/L (ref 15–37)
BARBITURATE SCREEN URINE: NORMAL
BASOPHILS ABSOLUTE: 0.1 K/UL (ref 0–0.2)
BASOPHILS RELATIVE PERCENT: 0.5 %
BENZODIAZEPINE SCREEN, URINE: NORMAL
BILIRUB SERPL-MCNC: 0.3 MG/DL (ref 0–1)
BUN BLDV-MCNC: 7 MG/DL (ref 7–20)
BUPRENORPHINE URINE: NORMAL
CALCIUM SERPL-MCNC: 10 MG/DL (ref 8.3–10.6)
CANNABINOID SCREEN URINE: NORMAL
CHLORIDE BLD-SCNC: 103 MMOL/L (ref 99–110)
CO2: 19 MMOL/L (ref 21–32)
COCAINE METABOLITE SCREEN URINE: NORMAL
CREAT SERPL-MCNC: <0.5 MG/DL (ref 0.6–1.1)
EOSINOPHILS ABSOLUTE: 0 K/UL (ref 0–0.6)
EOSINOPHILS RELATIVE PERCENT: 0.4 %
FENTANYL SCREEN, URINE: NORMAL
GFR SERPL CREATININE-BSD FRML MDRD: >60 ML/MIN/{1.73_M2}
GLUCOSE BLD-MCNC: 122 MG/DL (ref 70–99)
GLUCOSE BLD-MCNC: 156 MG/DL (ref 70–99)
HCT VFR BLD CALC: 37.3 % (ref 36–48)
HEMOGLOBIN: 12 G/DL (ref 12–16)
LYMPHOCYTES ABSOLUTE: 1.6 K/UL (ref 1–5.1)
LYMPHOCYTES RELATIVE PERCENT: 17.1 %
Lab: NORMAL
MCH RBC QN AUTO: 27.4 PG (ref 26–34)
MCHC RBC AUTO-ENTMCNC: 32.2 G/DL (ref 31–36)
MCV RBC AUTO: 85.1 FL (ref 80–100)
METHADONE SCREEN, URINE: NORMAL
MONOCYTES ABSOLUTE: 0.9 K/UL (ref 0–1.3)
MONOCYTES RELATIVE PERCENT: 9.7 %
NEUTROPHILS ABSOLUTE: 6.8 K/UL (ref 1.7–7.7)
NEUTROPHILS RELATIVE PERCENT: 72.3 %
OPIATE SCREEN URINE: NORMAL
OXYCODONE URINE: NORMAL
PDW BLD-RTO: 14.3 % (ref 12.4–15.4)
PERFORMED ON: ABNORMAL
PH UA: 6
PHENCYCLIDINE SCREEN URINE: NORMAL
PLATELET # BLD: 309 K/UL (ref 135–450)
PMV BLD AUTO: 7.8 FL (ref 5–10.5)
POTASSIUM REFLEX MAGNESIUM: 4 MMOL/L (ref 3.5–5.1)
RBC # BLD: 4.38 M/UL (ref 4–5.2)
SODIUM BLD-SCNC: 135 MMOL/L (ref 136–145)
TOTAL PROTEIN: 6.7 G/DL (ref 6.4–8.2)
WBC # BLD: 9.4 K/UL (ref 4–11)

## 2022-10-20 PROCEDURE — 2580000003 HC RX 258: Performed by: OBSTETRICS & GYNECOLOGY

## 2022-10-20 PROCEDURE — 80053 COMPREHEN METABOLIC PANEL: CPT

## 2022-10-20 PROCEDURE — 6370000000 HC RX 637 (ALT 250 FOR IP): Performed by: OBSTETRICS & GYNECOLOGY

## 2022-10-20 PROCEDURE — 83036 HEMOGLOBIN GLYCOSYLATED A1C: CPT

## 2022-10-20 PROCEDURE — 80307 DRUG TEST PRSMV CHEM ANLYZR: CPT

## 2022-10-20 PROCEDURE — G0378 HOSPITAL OBSERVATION PER HR: HCPCS

## 2022-10-20 PROCEDURE — 85025 COMPLETE CBC W/AUTO DIFF WBC: CPT

## 2022-10-20 RX ORDER — ACETAMINOPHEN 325 MG/1
650 TABLET ORAL EVERY 4 HOURS PRN
Status: DISCONTINUED | OUTPATIENT
Start: 2022-10-20 | End: 2022-10-21 | Stop reason: HOSPADM

## 2022-10-20 RX ORDER — ACETAMINOPHEN 650 MG/1
650 SUPPOSITORY RECTAL EVERY 4 HOURS PRN
Status: DISCONTINUED | OUTPATIENT
Start: 2022-10-20 | End: 2022-10-21 | Stop reason: HOSPADM

## 2022-10-20 RX ORDER — DEXTROSE MONOHYDRATE 100 MG/ML
INJECTION, SOLUTION INTRAVENOUS CONTINUOUS PRN
Status: DISCONTINUED | OUTPATIENT
Start: 2022-10-20 | End: 2022-10-21 | Stop reason: HOSPADM

## 2022-10-20 RX ORDER — ONDANSETRON 2 MG/ML
4 INJECTION INTRAMUSCULAR; INTRAVENOUS EVERY 6 HOURS PRN
Status: DISCONTINUED | OUTPATIENT
Start: 2022-10-20 | End: 2022-10-21 | Stop reason: HOSPADM

## 2022-10-20 RX ORDER — SODIUM CHLORIDE 0.9 % (FLUSH) 0.9 %
5-40 SYRINGE (ML) INJECTION PRN
Status: DISCONTINUED | OUTPATIENT
Start: 2022-10-20 | End: 2022-10-21 | Stop reason: HOSPADM

## 2022-10-20 RX ORDER — SODIUM CHLORIDE 9 MG/ML
INJECTION, SOLUTION INTRAVENOUS PRN
Status: DISCONTINUED | OUTPATIENT
Start: 2022-10-20 | End: 2022-10-21 | Stop reason: HOSPADM

## 2022-10-20 RX ORDER — ONDANSETRON 4 MG/1
4 TABLET, ORALLY DISINTEGRATING ORAL EVERY 8 HOURS PRN
Status: DISCONTINUED | OUTPATIENT
Start: 2022-10-20 | End: 2022-10-21 | Stop reason: HOSPADM

## 2022-10-20 RX ORDER — SODIUM CHLORIDE 0.9 % (FLUSH) 0.9 %
5-40 SYRINGE (ML) INJECTION EVERY 12 HOURS SCHEDULED
Status: DISCONTINUED | OUTPATIENT
Start: 2022-10-20 | End: 2022-10-21 | Stop reason: HOSPADM

## 2022-10-20 RX ADMIN — SODIUM CHLORIDE, PRESERVATIVE FREE 10 ML: 5 INJECTION INTRAVENOUS at 23:01

## 2022-10-20 RX ADMIN — INSULIN HUMAN 15 UNITS: 100 INJECTION, SUSPENSION SUBCUTANEOUS at 20:39

## 2022-10-20 RX ADMIN — INSULIN HUMAN 10 UNITS: 100 INJECTION, SOLUTION PARENTERAL at 20:36

## 2022-10-20 NOTE — H&P
Department of Obstetrics and Gynecology   Obstetrics History and Physical        CHIEF COMPLAINT:  elevated blood sugars    HISTORY OF PRESENT ILLNESS:      The patient is a 28 y.o. female at 32w2d. OB History          1    Para        Term                AB        Living             SAB        IAB        Ectopic        Molar        Multiple        Live Births                Patient presents with a chief complaint as above and is being admitted for blood sugar control. Pt seen in office today with fasting blood sugars in the 100's and post prandial blood sugars 250-270's. Estimated Due Date: Estimated Date of Delivery: 23    PRENATAL CARE:    Complicated by: LGA, Gestational Diabetes    PAST OB HISTORY:  OB History          1    Para        Term                AB        Living             SAB        IAB        Ectopic        Molar        Multiple        Live Births                    Past Medical History:        Diagnosis Date    Anemia     Diabetes mellitus ([facility] [address]      Past Surgical History:    No past surgical history on file. Allergies:  Patient has no known allergies.     Social History:    Social History     Socioeconomic History    Marital status: Single     Spouse name: Not on file    Number of children: Not on file    Years of education: Not on file    Highest education level: Not on file   Occupational History    Not on file   Tobacco Use    Smoking status: Never    Smokeless tobacco: Never   Vaping Use    Vaping Use: Never used   Substance and Sexual Activity    Alcohol use: Yes    Drug use: No    Sexual activity: Not Currently   Other Topics Concern    Not on file   Social History Narrative    Not on file     Social Determinants of Health     Financial Resource Strain: Not on file   Food Insecurity: Not on file   Transportation Needs: Not on file   Physical Activity: Not on file   Stress: Not on file   Social Connections: Not on file   Intimate Partner Violence: Not on file   Housing Stability: Not on file     Family History:   No family history on file. Medications Prior to Admission:  Medications Prior to Admission: Prenatal MV-Min-Fe Fum-FA-DHA (PRENATAL 1 PO), Take 1 tablet by mouth daily    REVIEW OF SYSTEMS:    CONSTITUTIONAL:  negative  RESPIRATORY:  negative  CARDIOVASCULAR:  negative  GASTROINTESTINAL:  negative  ALLERGIC/IMMUNOLOGIC:  negative  NEUROLOGICAL:  negative  BEHAVIOR/PSYCH:  negative    PHYSICAL EXAM:  Vitals:    10/20/22 1802   BP: (!) 144/70   Pulse: (!) 103   Resp: 18   Temp: 98.2 °F (36.8 °C)   TempSrc: Oral   Weight: 250 lb (113.4 kg)     General appearance:  awake, alert, cooperative, no apparent distress, and appears stated age  Neurologic:  Awake, alert, oriented to name, place and time. Lungs:  No increased work of breathing, good air exchange  Abdomen:  Soft, non tender, gravid, consistent with her gestational age   Fetal heart rate:  Reassuring. Pelvis:  Adequate pelvis  Cervix: deferred  Contraction frequency:  0 minutes    Membranes:  Intact    ASSESSMENT AND PLAN: 29 yo  29w4d, Uncontrolled Gestational Diabetes    Plan admit for blood sugar control, start split dose insulin. Diabetic counseling.

## 2022-10-21 VITALS
BODY MASS INDEX: 39.16 KG/M2 | RESPIRATION RATE: 16 BRPM | DIASTOLIC BLOOD PRESSURE: 66 MMHG | TEMPERATURE: 97.6 F | WEIGHT: 250 LBS | HEART RATE: 106 BPM | SYSTOLIC BLOOD PRESSURE: 115 MMHG

## 2022-10-21 LAB
ESTIMATED AVERAGE GLUCOSE: 142.7 MG/DL
GLUCOSE BLD-MCNC: 87 MG/DL (ref 70–99)
GLUCOSE BLD-MCNC: 93 MG/DL (ref 70–99)
GLUCOSE BLD-MCNC: 94 MG/DL (ref 70–99)
HBA1C MFR BLD: 6.6 %
PERFORMED ON: NORMAL

## 2022-10-21 PROCEDURE — 2580000003 HC RX 258: Performed by: OBSTETRICS & GYNECOLOGY

## 2022-10-21 PROCEDURE — 6370000000 HC RX 637 (ALT 250 FOR IP): Performed by: OBSTETRICS & GYNECOLOGY

## 2022-10-21 PROCEDURE — G0378 HOSPITAL OBSERVATION PER HR: HCPCS

## 2022-10-21 PROCEDURE — 59025 FETAL NON-STRESS TEST: CPT

## 2022-10-21 RX ORDER — GLUCOSAMINE HCL/CHONDROITIN SU 500-400 MG
CAPSULE ORAL
Qty: 120 STRIP | Refills: 0 | Status: SHIPPED | OUTPATIENT
Start: 2022-10-21

## 2022-10-21 RX ORDER — LANCETS 30 GAUGE
1 EACH MISCELLANEOUS 4 TIMES DAILY
Qty: 200 EACH | Refills: 0 | Status: SHIPPED | OUTPATIENT
Start: 2022-10-21

## 2022-10-21 RX ADMIN — INSULIN HUMAN 15 UNITS: 100 INJECTION, SOLUTION PARENTERAL at 08:21

## 2022-10-21 RX ADMIN — SODIUM CHLORIDE, PRESERVATIVE FREE 5 ML: 5 INJECTION INTRAVENOUS at 11:44

## 2022-10-21 RX ADMIN — INSULIN HUMAN 25 UNITS: 100 INJECTION, SUSPENSION SUBCUTANEOUS at 08:22

## 2022-10-21 NOTE — PROGRESS NOTES
Nutrition Note    Pt educated on gestational DM diet. Explained carb counting. Reviewed sources of CHO in diet and explained that 1 carb choice is 15 g carbohydrate. Explained and demonstrated label reading for total CHO. Discussed meal plan of 2 carb choices for breakfast; 3-4 for lunch; 3-4 for dinner and 1 carb choice for each of the 3 snacks per day. Reviewed protein foods and discussed including protein at each meal and snack. Written materials given to reinforce diet education.         Electronically signed by Valery Limon RD, JUANITO on 10/21/22 at 11:05 AM EDT  Contact: [phone]

## 2022-10-21 NOTE — PLAN OF CARE
Problem: Vaginal Birth or  Section  Goal: Fetal and maternal status remain reassuring during the birth process  Description:  Birth OB-Pregnancy care plan goal which identifies if the fetal and maternal status remain reassuring during the birth process  Outcome: Completed     Problem: Postpartum  Goal: Experiences normal postpartum course  Description:  Postpartum OB-Pregnancy care plan goal which identifies if the mother is experiencing a normal postpartum course  Outcome: Completed  Goal: Appropriate maternal -  bonding  Description:  Postpartum OB-Pregnancy care plan goal which identifies if the mother and  are bonding appropriately  Outcome: Completed  Goal: Establishment of infant feeding pattern  Description:  Postpartum OB-Pregnancy care plan goal which identifies if the mother is establishing a feeding pattern with their   Outcome: Completed  Goal: Incisions, wounds, or drain sites healing without S/S of infection  Outcome: Completed     Problem: Pain  Goal: Verbalizes/displays adequate comfort level or baseline comfort level  Outcome: Completed     Problem: Infection - Adult  Goal: Absence of infection at discharge  Outcome: Completed  Goal: Absence of infection during hospitalization  Outcome: Completed  Goal: Absence of fever/infection during anticipated neutropenic period  Outcome: Completed     Problem: Safety - Adult  Goal: Free from fall injury  Outcome: Completed     Problem: Discharge Planning  Goal: Discharge to home or other facility with appropriate resources  Outcome: Completed     Problem: Chronic Conditions and Co-morbidities  Goal: Patient's chronic conditions and co-morbidity symptoms are monitored and maintained or improved  Outcome: Completed

## 2022-10-21 NOTE — PROGRESS NOTES
Department of Obstetrics and Gynecology   Progress Note    Date of Admission: 10/20/2022  Hospital Day:  Hospital Day: 2      Subjective:    No complaints. No ctx, bleeding, LOF. Good FM. No lightheadedness. Objective:   Vitals:    10/21/22 0336 10/21/22 0620 10/21/22 0957 10/21/22 1346   BP: 118/63 139/64 (!) 140/69 115/66   Pulse: 96 (!) 105 (!) 108 (!) 106   Resp: 16 18 16 16   Temp: 98.1 °F (36.7 °C) 98.1 °F (36.7 °C) 97.9 °F (36.6 °C) 97.6 °F (36.4 °C)   TempSrc: Oral Oral Oral Oral   Weight:           Physical Examination:   General appearance - alert, well appearing, and in no distress    NST - reassuring, see procedure note    Lab Results   Component Value Date    WBC 9.4 10/20/2022    HGB 12.0 10/20/2022    HCT 37.3 10/20/2022     10/20/2022    ALT 14 10/20/2022    AST 24 10/20/2022     (L) 10/20/2022    K 4.0 10/20/2022     10/20/2022    CREATININE <0.5 (L) 10/20/2022    BUN 7 10/20/2022    CO2 19 (L) 10/20/2022    LABA1C 6.6 10/20/2022    LABMICR YES 2022           ASSESSMENT AND PLAN: 29 yo , 29+5 wks, GDMA2    BS normal today. Pt seen by diabetic educator and dietician. Will D/C home on current insulin regimen. F/U in 4d in office for BS check.

## 2022-10-21 NOTE — PROCEDURES
FETAL SURVEILLANCE TESTING SUMMARY    INDICATIONS:  gestational diabetes mellitus    OBJECTIVE RESULTS:  Fetal heart variability: moderate  Fetal Heart Rate accelerations: yes  Baseline FHR: 130's per minute  Fetal Non-stress Test: reassuring    Fetal surveillance: reassuring

## 2022-10-21 NOTE — PROGRESS NOTES
[facility]  Diabetes Education   Progress Note       NAME:  Hugo Suresh RECORD NUMBER:  [id number]  AGE: 28 y.o. GENDER: female  : 1995  TODAY'S DATE:  10/21/2022    Subjective   Reason for Diabetes Education Evaluation and Assessment: gestational diabetes    Visit Type: evaluation      Jackie Felder is a 28 y.o. female referred by:  [x] Physician  [] Nursing  [] Chart Review   [] Other:     Pt seen for DM education. Newly diagnosed with gestational diabetes. Instructed pt on lifestyle changes, monitoring BG, BG goals, insulin pen use, insulin regimen, s/s of hypo- and hyperglycemia, risks of uncontrolled BG in pregnancy, follow up care and monitoring for diabetes after pregnancy. Provided packet titled \"What you need to know abuot gestational diabetes\" by [employer], blood sugar log, and insulin pen instruction handout. PAST MEDICAL HISTORY        Diagnosis Date    Anemia     Diabetes mellitus ([address]        PAST SURGICAL HISTORY    History reviewed. No pertinent surgical history. FAMILY HISTORY    History reviewed. No pertinent family history.     SOCIAL HISTORY    Social History     Tobacco Use    Smoking status: Never    Smokeless tobacco: Never   Vaping Use    Vaping Use: Never used   Substance Use Topics    Alcohol use: Yes    Drug use: No       ALLERGIES    No Known Allergies    MEDICATIONS     sodium chloride flush  5-40 mL IntraVENous 2 times per day    insulin NPH  25 Units SubCUTAneous QAM AC    insulin NPH  15 Units SubCUTAneous Daily before dinner    insulin regular  15 Units SubCUTAneous QAM AC    insulin regular  10 Units SubCUTAneous Daily before dinner       Objective        Patient Active Problem List   Diagnosis Code    Gestational hypertension affecting first pregnancy O13.9        BP (!) 140/69   Pulse (!) 108   Temp 97.9 °F (36.6 °C) (Oral)   Resp 16   Wt 250 lb (113.4 kg)   LMP 2022   BMI 39.16 kg/m²     HgBA1c:    Lab Results   Component Value Date/Time    LABA1C 6.6 10/20/2022 06:47 PM       Recent Labs     10/20/22  2249 10/21/22  0618 10/21/22  0956   POCGLU 156* 93 94       BUN/Creatinine:    Lab Results   Component Value Date/Time    BUN 7 10/20/2022 06:47 PM    CREATININE <0.5 10/20/2022 06:47 PM       Assessment        Diabetes Management and Education    Does the patient have a Primary Care Physician? Yes     Does the patient require new medication instruction? Yes, insulin    Person responsible for administration of Insulin/Medication:   [x] Self     [] Caregiver       [] Spouse       [] Other Family Member   []  Other    Insulin Instruction:  insulin pen  Injection Site:   [x] location     Level of patient/caregiver understanding:     [x] Verbalized Understanding   [] Demonstrated Understanding       [] Teach Back       [] Needs Reinforcement     []  Other:      Does the patient/caregiver monitor Blood Glucoses? Yes, has a OneTouch Ultra 2 at bedside    Does the patient/caregiver understand S/S of Hypoglycemia? Yes  Reviewed symptoms, prevention and treatment. Level of patient/caregiver understanding:    [x] Verbalized Understanding   [] Demonstrated Understanding       [] Teach Back       [] Needs Reinforcement     []  Other:                    Does the patient/caregiver understand S/S of Hyperglycemia? Yes    Reviewed symptoms, prevention and treatment. Level of patient/caregiver understanding:      [x] Verbalized Understanding   [] Demonstrated Understanding       [] Teach Back       [] Needs Reinforcement     []  Other:           Plan        Pt seen by dietitian for carb counting education    Discharge Plan:  Per pharmacy, insulin will require prior auth from MD office and Humulin R pens are not currently in stock at [facility]. Prescriptions Humulin R and pen needles have been called in to [facility].   Pharmacy states test strips and lancets are ready for refill at pt's outside pharmacy, so unable to fill here at [facility].      Teaching Time Diabetes Education:  30 minutes     Electronically signed by Manju Grimaldo RN [facility] on 10/21/2022 at 12:48 PM

## 2022-10-21 NOTE — DISCHARGE INSTRUCTIONS
Follow up by Tuesday 10/25/22 with OB provider at 21 [phone]. Gestational Diabetes Diet: Care Instructions  Overview     Gestational diabetes is a form of diabetes that can happen during pregnancy. It usually goes away after the baby is born. Gestational diabetes occurs when your body does not use insulin properly. Insulin helps sugar enter your cells, where it is used for energy. You may be able to manage your blood sugar while you are pregnant by eating a healthy diet and getting regular exercise. A dietitian or diabetes educator can help you make a food plan. This plan will help manage your blood sugar and provide good nutrition for you and your baby. If diet and exercise don't help keep your blood sugar in target range, you may need diabetes medicine or insulin. Follow-up care is a key part of your treatment and safety. Be sure to make and go to all appointments, and call your doctor if you are having problems. It's also a good idea to know your test results and keep a list of the medicines you take. How can you care for yourself at home? Learn which foods have carbohydrate. Eating too much carbohydrate will cause your blood sugar to go too high. Carbohydrate foods include:  Breads, cereals, pasta, and rice. Dried beans and starchy vegetables, like corn, peas, and potatoes. Fruits and fruit juice, milk, and yogurt. Candy, table sugar, soda pop, and drinks sweetened with sugar. Learn how much carbohydrate you need at meals and snacks. A dietitian or diabetes educator can teach you how to keep track of how much carbohydrate you eat. Limit foods that have added sugar. This includes candy, desserts, and soda pop. These foods need to be counted as part of your total carbohydrate intake for the day. Do not drink alcohol. Alcohol is not safe for you or your baby. Do not skip meals. Your blood sugar may drop too low if you skip meals and use insulin. Write down what you eat every day.  Review your record with your dietitian or diabetes educator to see if you are eating the right amounts of foods. Check your blood sugar first thing in the morning before you eat. Then check your blood sugar 1 to 2 hours after the first bite of each meal (or as your doctor recommends). This will help you see how the food you eat affects your blood sugar. Keep track of these levels. Share the record with your doctor. When should you call for help? Watch closely for changes in your health, and be sure to contact your doctor if:    You have questions about your diet. You often have problems with high or low blood sugar. Where can you learn more? Go to [email or web address] and sign in to your [email or web address] account. Enter M291 in the Banno box to learn more about \"Gestational Diabetes Diet: Care Instructions. \"     If you do not have an account, please click on the \"Sign Up Now\" link. Current as of: October 6, 2021               Content Version: 13.4  © 2006-2022 [employer]. Care instructions adapted under license by [facility] ([facility]). If you have questions about a medical condition or this instruction, always ask your healthcare professional. [place] [address] any warranty or liability for your use of this information. Home Undelivered Discharge Instructions                    Diet:  carb control    Rest: normal activity as tolerated    Other instructions: Do kick counts once a day on your baby. Choose the time of day your baby is most active. Get in a comfortable lying or sitting position and time how long it takes to feel 10 kicks, twists, turns, swishes, or rolls.  Call your physician or midwife if there have not been 10 kicks in 1 hours    Call physician or midwife, return to Labor and Delivery, call 911, or go to the nearest Emergency Room if: increased leakage or fluid, contractions more than  5 per  1 hour, decreased fetal movement, persistent low back pain or cramping, bleeding from vaginal area, difficulty urinating, pain with urination, difficulty breathing, new calf pain, persistent headache, or vision change

## 2022-10-21 NOTE — FLOWSHEET NOTE
Patient denies any questions on discharge instructions and copy of instructions provided to patient. She states she has a follow up appointment for Tuesday, 10-25-22 with Dr. Castillo Hernandez. Patient discharged in stable condition ambulatory headed to home undelivered with significant other at her side. Patient aware she needs to  her test strips, insulin, and lancets from her pharmacy.

## 2022-10-21 NOTE — DISCHARGE SUMMARY
Obstetrical Discharge Form    Gestational Age: 34w10d    Antepartum complications: GDMA2, [address] course: Pt admitted for blood sugar control. Was seen by diabetic educator and dietician. BS normal in hospital. D/C home    Discharge Medication:      Medication List        START taking these medications      blood glucose test strips  Test 4  times a day & as needed for symptoms of irregular blood glucose. Dispense sufficient amount for indicated testing frequency plus additional to accommodate PRN testing needs. * insulin  UNIT/ML injection pen  Commonly known as: HUMULIN N;NOVOLIN N  Inject 15 Units into the skin daily (before dinner)     * insulin  UNIT/ML injection pen  Commonly known as: HUMULIN N;NOVOLIN N  Inject 25 Units into the skin every morning (before breakfast)  Start taking on: October 22, 2022     * insulin regular 100 UNIT/ML injection  Commonly known as: HUMULIN R;NOVOLIN R  Inject 10 Units into the skin daily (before dinner)     * insulin regular 100 UNIT/ML injection  Commonly known as: HUMULIN R;NOVOLIN R  Inject 15 Units into the skin every morning (before breakfast)  Start taking on: October 22, 2022     Lancets Misc  1 each by Does not apply route 4 times daily           * This list has 4 medication(s) that are the same as other medications prescribed for you. Read the directions carefully, and ask your doctor or other care provider to review them with you.                 CONTINUE taking these medications      PRENATAL 1 PO               Where to Get Your Medications        You can get these medications from any pharmacy    Bring a paper prescription for each of these medications  blood glucose test strips  insulin  UNIT/ML injection pen  insulin  UNIT/ML injection pen  insulin regular 100 UNIT/ML injection  insulin regular 100 UNIT/ML injection  Lancets Misc         Discharge Condition:  good    Discharge Date: 10/21/22    PLAN:  Follow up in 6 weeks for routine PP visit  All questions answered  D/C summary begun at delivery for D/C planning purposes, any delay in discharge from ordered D/C date due to  factors.

## 2022-10-24 ENCOUNTER — TELEPHONE (OUTPATIENT)
Dept: PRIMARY CARE CLINIC | Age: 27
End: 2022-10-24

## 2022-12-23 ENCOUNTER — HOSPITAL ENCOUNTER (INPATIENT)
Age: 27
LOS: 2 days | Discharge: HOME | DRG: 540 | End: 2022-12-25
Attending: OBSTETRICS & GYNECOLOGY | Admitting: OBSTETRICS & GYNECOLOGY
Payer: MEDICAID

## 2022-12-23 ENCOUNTER — ANESTHESIA EVENT (OUTPATIENT)
Dept: LABOR AND DELIVERY | Age: 27
DRG: 540 | End: 2022-12-23
Payer: MEDICAID

## 2022-12-23 ENCOUNTER — ANESTHESIA (OUTPATIENT)
Dept: LABOR AND DELIVERY | Age: 27
DRG: 540 | End: 2022-12-23
Payer: MEDICAID

## 2022-12-23 PROBLEM — O47.9 THREATENED LABOR AT TERM: Status: ACTIVE | Noted: 2022-12-23

## 2022-12-23 LAB
ABO/RH: NORMAL
AMPHETAMINE SCREEN, URINE: NORMAL
ANTIBODY SCREEN: NORMAL
BARBITURATE SCREEN URINE: NORMAL
BENZODIAZEPINE SCREEN, URINE: NORMAL
BUPRENORPHINE URINE: NORMAL
CANNABINOID SCREEN URINE: NORMAL
COCAINE METABOLITE SCREEN URINE: NORMAL
FENTANYL SCREEN, URINE: NORMAL
GLUCOSE BLD-MCNC: 82 MG/DL (ref 70–99)
GLUCOSE BLD-MCNC: 83 MG/DL (ref 70–99)
GLUCOSE BLD-MCNC: 84 MG/DL (ref 70–99)
GLUCOSE FASTING: 87 MG/DL (ref 70–99)
HCT VFR BLD CALC: 39.6 % (ref 36–48)
HEMOGLOBIN: 13 G/DL (ref 12–16)
Lab: NORMAL
MCH RBC QN AUTO: 28.4 PG (ref 26–34)
MCHC RBC AUTO-ENTMCNC: 32.9 G/DL (ref 31–36)
MCV RBC AUTO: 86.3 FL (ref 80–100)
METHADONE SCREEN, URINE: NORMAL
OPIATE SCREEN URINE: NORMAL
OXYCODONE URINE: NORMAL
PDW BLD-RTO: 14.4 % (ref 12.4–15.4)
PERFORMED ON: NORMAL
PH UA: 5
PHENCYCLIDINE SCREEN URINE: NORMAL
PLATELET # BLD: 308 K/UL (ref 135–450)
PMV BLD AUTO: 8.4 FL (ref 5–10.5)
RBC # BLD: 4.58 M/UL (ref 4–5.2)
TOTAL SYPHILLIS IGG/IGM: NORMAL
WBC # BLD: 8.8 K/UL (ref 4–11)

## 2022-12-23 PROCEDURE — 6360000002 HC RX W HCPCS: Performed by: NURSE ANESTHETIST, CERTIFIED REGISTERED

## 2022-12-23 PROCEDURE — 80307 DRUG TEST PRSMV CHEM ANLYZR: CPT

## 2022-12-23 PROCEDURE — 85027 COMPLETE CBC AUTOMATED: CPT

## 2022-12-23 PROCEDURE — 86780 TREPONEMA PALLIDUM: CPT

## 2022-12-23 PROCEDURE — 82947 ASSAY GLUCOSE BLOOD QUANT: CPT

## 2022-12-23 PROCEDURE — 7100000000 HC PACU RECOVERY - FIRST 15 MIN: Performed by: OBSTETRICS & GYNECOLOGY

## 2022-12-23 PROCEDURE — 86850 RBC ANTIBODY SCREEN: CPT

## 2022-12-23 PROCEDURE — 3609079900 HC CESAREAN SECTION: Performed by: OBSTETRICS & GYNECOLOGY

## 2022-12-23 PROCEDURE — 3700000000 HC ANESTHESIA ATTENDED CARE: Performed by: OBSTETRICS & GYNECOLOGY

## 2022-12-23 PROCEDURE — 6370000000 HC RX 637 (ALT 250 FOR IP): Performed by: OBSTETRICS & GYNECOLOGY

## 2022-12-23 PROCEDURE — 3700000001 HC ADD 15 MINUTES (ANESTHESIA): Performed by: OBSTETRICS & GYNECOLOGY

## 2022-12-23 PROCEDURE — 2500000003 HC RX 250 WO HCPCS: Performed by: NURSE ANESTHETIST, CERTIFIED REGISTERED

## 2022-12-23 PROCEDURE — 2580000003 HC RX 258: Performed by: OBSTETRICS & GYNECOLOGY

## 2022-12-23 PROCEDURE — 86901 BLOOD TYPING SEROLOGIC RH(D): CPT

## 2022-12-23 PROCEDURE — 2709999900 HC NON-CHARGEABLE SUPPLY: Performed by: OBSTETRICS & GYNECOLOGY

## 2022-12-23 PROCEDURE — 6360000002 HC RX W HCPCS: Performed by: OBSTETRICS & GYNECOLOGY

## 2022-12-23 PROCEDURE — 2500000003 HC RX 250 WO HCPCS: Performed by: OBSTETRICS & GYNECOLOGY

## 2022-12-23 PROCEDURE — 7100000001 HC PACU RECOVERY - ADDTL 15 MIN: Performed by: OBSTETRICS & GYNECOLOGY

## 2022-12-23 PROCEDURE — 10907ZC DRAINAGE OF AMNIOTIC FLUID, THERAPEUTIC FROM PRODUCTS OF CONCEPTION, VIA NATURAL OR ARTIFICIAL OPENING: ICD-10-PCS | Performed by: OBSTETRICS & GYNECOLOGY

## 2022-12-23 PROCEDURE — 86900 BLOOD TYPING SEROLOGIC ABO: CPT

## 2022-12-23 PROCEDURE — 3700000025 EPIDURAL BLOCK: Performed by: ANESTHESIOLOGY

## 2022-12-23 PROCEDURE — 1220000000 HC SEMI PRIVATE OB R&B

## 2022-12-23 PROCEDURE — A4216 STERILE WATER/SALINE, 10 ML: HCPCS | Performed by: OBSTETRICS & GYNECOLOGY

## 2022-12-23 RX ORDER — POLYETHYLENE GLYCOL 3350 17 G/17G
17 POWDER, FOR SOLUTION ORAL DAILY
Status: DISCONTINUED | OUTPATIENT
Start: 2022-12-23 | End: 2022-12-25 | Stop reason: HOSPADM

## 2022-12-23 RX ORDER — FENTANYL/BUPIVACAINE/NS/PF 2-1250MCG
12 PLASTIC BAG, INJECTION (ML) INJECTION CONTINUOUS
Status: DISCONTINUED | OUTPATIENT
Start: 2022-12-23 | End: 2022-12-23

## 2022-12-23 RX ORDER — ONDANSETRON 2 MG/ML
4 INJECTION INTRAMUSCULAR; INTRAVENOUS EVERY 6 HOURS PRN
Status: DISCONTINUED | OUTPATIENT
Start: 2022-12-23 | End: 2022-12-23

## 2022-12-23 RX ORDER — FAMOTIDINE 20 MG/1
20 TABLET, FILM COATED ORAL 2 TIMES DAILY PRN
Status: DISCONTINUED | OUTPATIENT
Start: 2022-12-23 | End: 2022-12-25 | Stop reason: HOSPADM

## 2022-12-23 RX ORDER — ONDANSETRON 2 MG/ML
4 INJECTION INTRAMUSCULAR; INTRAVENOUS EVERY 6 HOURS PRN
Status: DISCONTINUED | OUTPATIENT
Start: 2022-12-23 | End: 2022-12-25 | Stop reason: HOSPADM

## 2022-12-23 RX ORDER — ACETAMINOPHEN 500 MG
1000 TABLET ORAL EVERY 8 HOURS
Status: DISCONTINUED | OUTPATIENT
Start: 2022-12-23 | End: 2022-12-25 | Stop reason: HOSPADM

## 2022-12-23 RX ORDER — IBUPROFEN 800 MG/1
800 TABLET ORAL EVERY 6 HOURS PRN
Qty: 60 TABLET | Refills: 0 | Status: SHIPPED | OUTPATIENT
Start: 2022-12-23

## 2022-12-23 RX ORDER — ONDANSETRON 8 MG/1
8 TABLET, ORALLY DISINTEGRATING ORAL EVERY 8 HOURS PRN
Status: DISCONTINUED | OUTPATIENT
Start: 2022-12-23 | End: 2022-12-25 | Stop reason: HOSPADM

## 2022-12-23 RX ORDER — CARBOPROST TROMETHAMINE 250 UG/ML
250 INJECTION, SOLUTION INTRAMUSCULAR PRN
Status: DISCONTINUED | OUTPATIENT
Start: 2022-12-23 | End: 2022-12-23

## 2022-12-23 RX ORDER — MISOPROSTOL 100 UG/1
800 TABLET ORAL PRN
Status: DISCONTINUED | OUTPATIENT
Start: 2022-12-23 | End: 2022-12-25 | Stop reason: HOSPADM

## 2022-12-23 RX ORDER — OXYCODONE HYDROCHLORIDE 5 MG/1
5 TABLET ORAL EVERY 6 HOURS PRN
Qty: 28 TABLET | Refills: 0 | Status: SHIPPED | OUTPATIENT
Start: 2022-12-23 | End: 2022-12-30

## 2022-12-23 RX ORDER — FENTANYL/BUPIVACAINE/NS/PF 2-1250MCG
PLASTIC BAG, INJECTION (ML) INJECTION
Status: COMPLETED
Start: 2022-12-23 | End: 2022-12-23

## 2022-12-23 RX ORDER — SODIUM CHLORIDE, SODIUM LACTATE, POTASSIUM CHLORIDE, CALCIUM CHLORIDE 600; 310; 30; 20 MG/100ML; MG/100ML; MG/100ML; MG/100ML
INJECTION, SOLUTION INTRAVENOUS CONTINUOUS
Status: DISCONTINUED | OUTPATIENT
Start: 2022-12-23 | End: 2022-12-25 | Stop reason: HOSPADM

## 2022-12-23 RX ORDER — LIDOCAINE HYDROCHLORIDE 20 MG/ML
INJECTION, SOLUTION EPIDURAL; INFILTRATION; INTRACAUDAL; PERINEURAL PRN
Status: DISCONTINUED | OUTPATIENT
Start: 2022-12-23 | End: 2022-12-23 | Stop reason: SDUPTHER

## 2022-12-23 RX ORDER — DIPHENHYDRAMINE HYDROCHLORIDE 50 MG/ML
25 INJECTION INTRAMUSCULAR; INTRAVENOUS EVERY 6 HOURS PRN
Status: DISCONTINUED | OUTPATIENT
Start: 2022-12-23 | End: 2022-12-25 | Stop reason: HOSPADM

## 2022-12-23 RX ORDER — SODIUM CHLORIDE 0.9 % (FLUSH) 0.9 %
5-40 SYRINGE (ML) INJECTION EVERY 12 HOURS SCHEDULED
Status: DISCONTINUED | OUTPATIENT
Start: 2022-12-23 | End: 2022-12-25 | Stop reason: HOSPADM

## 2022-12-23 RX ORDER — DEXAMETHASONE SODIUM PHOSPHATE 4 MG/ML
INJECTION, SOLUTION INTRA-ARTICULAR; INTRALESIONAL; INTRAMUSCULAR; INTRAVENOUS; SOFT TISSUE PRN
Status: DISCONTINUED | OUTPATIENT
Start: 2022-12-23 | End: 2022-12-23 | Stop reason: SDUPTHER

## 2022-12-23 RX ORDER — ACETAMINOPHEN 500 MG
1000 TABLET ORAL ONCE
Status: COMPLETED | OUTPATIENT
Start: 2022-12-23 | End: 2022-12-23

## 2022-12-23 RX ORDER — ONDANSETRON 2 MG/ML
INJECTION INTRAMUSCULAR; INTRAVENOUS PRN
Status: DISCONTINUED | OUTPATIENT
Start: 2022-12-23 | End: 2022-12-23 | Stop reason: SDUPTHER

## 2022-12-23 RX ORDER — LIDOCAINE HYDROCHLORIDE AND EPINEPHRINE 15; 5 MG/ML; UG/ML
INJECTION, SOLUTION EPIDURAL PRN
Status: DISCONTINUED | OUTPATIENT
Start: 2022-12-23 | End: 2022-12-23 | Stop reason: SDUPTHER

## 2022-12-23 RX ORDER — SODIUM CHLORIDE 9 MG/ML
INJECTION, SOLUTION INTRAVENOUS CONTINUOUS
Status: DISCONTINUED | OUTPATIENT
Start: 2022-12-23 | End: 2022-12-23

## 2022-12-23 RX ORDER — IBUPROFEN 800 MG/1
800 TABLET ORAL EVERY 8 HOURS
Status: DISCONTINUED | OUTPATIENT
Start: 2022-12-23 | End: 2022-12-25 | Stop reason: HOSPADM

## 2022-12-23 RX ORDER — LIDOCAINE HYDROCHLORIDE AND EPINEPHRINE 20; 5 MG/ML; UG/ML
INJECTION, SOLUTION EPIDURAL; INFILTRATION; INTRACAUDAL; PERINEURAL PRN
Status: DISCONTINUED | OUTPATIENT
Start: 2022-12-23 | End: 2022-12-23 | Stop reason: SDUPTHER

## 2022-12-23 RX ORDER — OXYCODONE HYDROCHLORIDE 5 MG/1
10 TABLET ORAL EVERY 4 HOURS PRN
Status: DISCONTINUED | OUTPATIENT
Start: 2022-12-23 | End: 2022-12-25 | Stop reason: HOSPADM

## 2022-12-23 RX ORDER — SODIUM CHLORIDE 0.9 % (FLUSH) 0.9 %
5-40 SYRINGE (ML) INJECTION PRN
Status: DISCONTINUED | OUTPATIENT
Start: 2022-12-23 | End: 2022-12-25 | Stop reason: HOSPADM

## 2022-12-23 RX ORDER — METOCLOPRAMIDE HYDROCHLORIDE 5 MG/ML
10 INJECTION INTRAMUSCULAR; INTRAVENOUS EVERY 6 HOURS
Status: DISCONTINUED | OUTPATIENT
Start: 2022-12-23 | End: 2022-12-23

## 2022-12-23 RX ORDER — ACETAMINOPHEN 325 MG/1
650 TABLET ORAL EVERY 4 HOURS PRN
Status: DISCONTINUED | OUTPATIENT
Start: 2022-12-23 | End: 2022-12-23

## 2022-12-23 RX ORDER — METHYLERGONOVINE MALEATE 0.2 MG/ML
200 INJECTION INTRAVENOUS PRN
Status: DISCONTINUED | OUTPATIENT
Start: 2022-12-23 | End: 2022-12-25 | Stop reason: HOSPADM

## 2022-12-23 RX ORDER — BISACODYL 10 MG
10 SUPPOSITORY, RECTAL RECTAL DAILY PRN
Status: DISCONTINUED | OUTPATIENT
Start: 2022-12-23 | End: 2022-12-25 | Stop reason: HOSPADM

## 2022-12-23 RX ORDER — MODIFIED LANOLIN
OINTMENT (GRAM) TOPICAL
Status: DISCONTINUED | OUTPATIENT
Start: 2022-12-23 | End: 2022-12-25 | Stop reason: HOSPADM

## 2022-12-23 RX ORDER — LIDOCAINE HYDROCHLORIDE 10 MG/ML
INJECTION, SOLUTION EPIDURAL; INFILTRATION; INTRACAUDAL; PERINEURAL
Status: DISCONTINUED
Start: 2022-12-23 | End: 2022-12-23 | Stop reason: WASHOUT

## 2022-12-23 RX ORDER — LIDOCAINE HYDROCHLORIDE 10 MG/ML
INJECTION, SOLUTION INFILTRATION; PERINEURAL PRN
Status: DISCONTINUED | OUTPATIENT
Start: 2022-12-23 | End: 2022-12-23 | Stop reason: SDUPTHER

## 2022-12-23 RX ORDER — SIMETHICONE 80 MG
80 TABLET,CHEWABLE ORAL EVERY 6 HOURS PRN
Status: DISCONTINUED | OUTPATIENT
Start: 2022-12-23 | End: 2022-12-25 | Stop reason: HOSPADM

## 2022-12-23 RX ORDER — SODIUM CHLORIDE 9 MG/ML
INJECTION, SOLUTION INTRAVENOUS PRN
Status: DISCONTINUED | OUTPATIENT
Start: 2022-12-23 | End: 2022-12-25 | Stop reason: HOSPADM

## 2022-12-23 RX ORDER — MISOPROSTOL 100 UG/1
800 TABLET ORAL PRN
Status: DISCONTINUED | OUTPATIENT
Start: 2022-12-23 | End: 2022-12-23

## 2022-12-23 RX ORDER — BUPIVACAINE HYDROCHLORIDE 2.5 MG/ML
INJECTION, SOLUTION EPIDURAL; INFILTRATION; INTRACAUDAL PRN
Status: DISCONTINUED | OUTPATIENT
Start: 2022-12-23 | End: 2022-12-23 | Stop reason: SDUPTHER

## 2022-12-23 RX ORDER — ACETAMINOPHEN 500 MG
1000 TABLET ORAL 3 TIMES DAILY
Qty: 60 TABLET | Refills: 0 | Status: SHIPPED | OUTPATIENT
Start: 2022-12-23

## 2022-12-23 RX ORDER — DOCUSATE SODIUM 100 MG/1
100 CAPSULE, LIQUID FILLED ORAL 2 TIMES DAILY
Status: DISCONTINUED | OUTPATIENT
Start: 2022-12-23 | End: 2022-12-25 | Stop reason: HOSPADM

## 2022-12-23 RX ORDER — KETOROLAC TROMETHAMINE 30 MG/ML
30 INJECTION, SOLUTION INTRAMUSCULAR; INTRAVENOUS EVERY 8 HOURS
Status: DISCONTINUED | OUTPATIENT
Start: 2022-12-23 | End: 2022-12-25

## 2022-12-23 RX ORDER — FENTANYL/BUPIVACAINE/NS/PF 2-1250MCG
PLASTIC BAG, INJECTION (ML) INJECTION CONTINUOUS PRN
Status: DISCONTINUED | OUTPATIENT
Start: 2022-12-23 | End: 2022-12-23 | Stop reason: SDUPTHER

## 2022-12-23 RX ORDER — FERROUS SULFATE 325(65) MG
325 TABLET ORAL
Status: DISCONTINUED | OUTPATIENT
Start: 2022-12-24 | End: 2022-12-25 | Stop reason: HOSPADM

## 2022-12-23 RX ORDER — DOCUSATE SODIUM 100 MG/1
100 CAPSULE, LIQUID FILLED ORAL 2 TIMES DAILY
Status: DISCONTINUED | OUTPATIENT
Start: 2022-12-23 | End: 2022-12-23

## 2022-12-23 RX ORDER — CARBOPROST TROMETHAMINE 250 UG/ML
250 INJECTION, SOLUTION INTRAMUSCULAR PRN
Status: DISCONTINUED | OUTPATIENT
Start: 2022-12-23 | End: 2022-12-25 | Stop reason: HOSPADM

## 2022-12-23 RX ORDER — DEXMEDETOMIDINE HYDROCHLORIDE 100 UG/ML
INJECTION, SOLUTION INTRAVENOUS PRN
Status: DISCONTINUED | OUTPATIENT
Start: 2022-12-23 | End: 2022-12-23 | Stop reason: SDUPTHER

## 2022-12-23 RX ORDER — TRISODIUM CITRATE DIHYDRATE AND CITRIC ACID MONOHYDRATE 500; 334 MG/5ML; MG/5ML
30 SOLUTION ORAL ONCE
Status: COMPLETED | OUTPATIENT
Start: 2022-12-23 | End: 2022-12-23

## 2022-12-23 RX ORDER — METHYLERGONOVINE MALEATE 0.2 MG/ML
200 INJECTION INTRAVENOUS PRN
Status: DISCONTINUED | OUTPATIENT
Start: 2022-12-23 | End: 2022-12-23

## 2022-12-23 RX ORDER — OXYCODONE HYDROCHLORIDE 5 MG/1
5 TABLET ORAL EVERY 4 HOURS PRN
Status: DISCONTINUED | OUTPATIENT
Start: 2022-12-23 | End: 2022-12-25 | Stop reason: HOSPADM

## 2022-12-23 RX ORDER — MISOPROSTOL 100 UG/1
200 TABLET ORAL PRN
Status: DISCONTINUED | OUTPATIENT
Start: 2022-12-23 | End: 2022-12-25 | Stop reason: HOSPADM

## 2022-12-23 RX ORDER — MORPHINE SULFATE 1 MG/ML
INJECTION, SOLUTION EPIDURAL; INTRATHECAL; INTRAVENOUS PRN
Status: DISCONTINUED | OUTPATIENT
Start: 2022-12-23 | End: 2022-12-23 | Stop reason: SDUPTHER

## 2022-12-23 RX ADMIN — SODIUM CHLORIDE: 9 INJECTION, SOLUTION INTRAVENOUS at 06:03

## 2022-12-23 RX ADMIN — MORPHINE SULFATE 2 MG: 1 INJECTION EPIDURAL; INTRATHECAL; INTRAVENOUS at 16:04

## 2022-12-23 RX ADMIN — LIDOCAINE HYDROCHLORIDE 3 ML: 20 INJECTION, SOLUTION EPIDURAL; INFILTRATION; INTRACAUDAL; PERINEURAL at 13:17

## 2022-12-23 RX ADMIN — CEFAZOLIN 2000 MG: 2 INJECTION, POWDER, FOR SOLUTION INTRAMUSCULAR; INTRAVENOUS at 15:05

## 2022-12-23 RX ADMIN — Medication 87.3 MILLI-UNITS/MIN: at 17:03

## 2022-12-23 RX ADMIN — SODIUM CHLORIDE: 9 INJECTION, SOLUTION INTRAVENOUS at 14:53

## 2022-12-23 RX ADMIN — LIDOCAINE HYDROCHLORIDE AND EPINEPHRINE 3 ML: 15; 5 INJECTION, SOLUTION EPIDURAL at 06:28

## 2022-12-23 RX ADMIN — SODIUM CHLORIDE: 9 INJECTION, SOLUTION INTRAVENOUS at 10:36

## 2022-12-23 RX ADMIN — LIDOCAINE HYDROCHLORIDE AND EPINEPHRINE 5 ML: 20; 5 INJECTION, SOLUTION EPIDURAL; INFILTRATION; INTRACAUDAL; PERINEURAL at 15:25

## 2022-12-23 RX ADMIN — FAMOTIDINE 20 MG: 10 INJECTION, SOLUTION INTRAVENOUS at 14:56

## 2022-12-23 RX ADMIN — SODIUM CHLORIDE: 9 INJECTION, SOLUTION INTRAVENOUS at 17:01

## 2022-12-23 RX ADMIN — LIDOCAINE HYDROCHLORIDE AND EPINEPHRINE 5 ML: 20; 5 INJECTION, SOLUTION EPIDURAL; INFILTRATION; INTRACAUDAL; PERINEURAL at 15:35

## 2022-12-23 RX ADMIN — DEXMEDETOMIDINE HYDROCHLORIDE 5 MCG: 100 INJECTION, SOLUTION INTRAVENOUS at 06:28

## 2022-12-23 RX ADMIN — Medication 12 ML/HR: at 06:31

## 2022-12-23 RX ADMIN — BUPIVACAINE HYDROCHLORIDE 5 ML: 2.5 INJECTION, SOLUTION EPIDURAL; INFILTRATION; INTRACAUDAL; PERINEURAL at 13:16

## 2022-12-23 RX ADMIN — ONDANSETRON 4 MG: 2 INJECTION INTRAMUSCULAR; INTRAVENOUS at 15:40

## 2022-12-23 RX ADMIN — LIDOCAINE HYDROCHLORIDE AND EPINEPHRINE 5 ML: 20; 5 INJECTION, SOLUTION EPIDURAL; INFILTRATION; INTRACAUDAL; PERINEURAL at 15:12

## 2022-12-23 RX ADMIN — LIDOCAINE HYDROCHLORIDE 3 ML: 10 INJECTION, SOLUTION INFILTRATION; PERINEURAL at 06:27

## 2022-12-23 RX ADMIN — DEXAMETHASONE SODIUM PHOSPHATE 8 MG: 4 INJECTION, SOLUTION INTRAMUSCULAR; INTRAVENOUS at 15:41

## 2022-12-23 RX ADMIN — Medication 999 ML/HR: at 16:05

## 2022-12-23 RX ADMIN — ACETAMINOPHEN 1000 MG: 500 TABLET ORAL at 14:57

## 2022-12-23 RX ADMIN — METOCLOPRAMIDE 10 MG: 5 INJECTION, SOLUTION INTRAMUSCULAR; INTRAVENOUS at 14:56

## 2022-12-23 RX ADMIN — SODIUM CHLORIDE: 9 INJECTION, SOLUTION INTRAVENOUS at 06:39

## 2022-12-23 RX ADMIN — SODIUM CITRATE AND CITRIC ACID MONOHYDRATE 30 ML: 500; 334 SOLUTION ORAL at 14:57

## 2022-12-23 RX ADMIN — LIDOCAINE HYDROCHLORIDE AND EPINEPHRINE 5 ML: 20; 5 INJECTION, SOLUTION EPIDURAL; INFILTRATION; INTRACAUDAL; PERINEURAL at 16:04

## 2022-12-23 RX ADMIN — SODIUM CHLORIDE: 9 INJECTION, SOLUTION INTRAVENOUS at 08:15

## 2022-12-23 NOTE — H&P
Department of Obstetrics and Gynecology   Obstetrics History and Physical        CHIEF COMPLAINT:  contractions    HISTORY OF PRESENT ILLNESS:      The patient is a 28 y.o. female at 44w7d. OB History          1    Para        Term                AB        Living             SAB        IAB        Ectopic        Molar        Multiple        Live Births                Patient presents with a chief complaint as above and is being admitted for active phase labor    Estimated Due Date: Estimated Date of Delivery: 23    PRENATAL CARE:    Complicated by: [id number], suspected LGA, obesity, hx of mild anemia    PAST OB HISTORY:  OB History          1    Para        Term                AB        Living             SAB        IAB        Ectopic        Molar        Multiple        Live Births                    Past Medical History:        Diagnosis Date    Anemia     Diabetes mellitus ([facility] [address]      Past Surgical History:    No past surgical history on file. Allergies:  Patient has no known allergies.     Social History:    Social History     Socioeconomic History    Marital status: Single     Spouse name: Not on file    Number of children: Not on file    Years of education: Not on file    Highest education level: Not on file   Occupational History    Not on file   Tobacco Use    Smoking status: Never    Smokeless tobacco: Never   Vaping Use    Vaping Use: Never used   Substance and Sexual Activity    Alcohol use: Yes    Drug use: No    Sexual activity: Not Currently   Other Topics Concern    Not on file   Social History Narrative    Not on file     Social Determinants of Health     Financial Resource Strain: Not on file   Food Insecurity: Not on file   Transportation Needs: Not on file   Physical Activity: Not on file   Stress: Not on file   Social Connections: Not on file   Intimate Partner Violence: Not on file   Housing Stability: Not on file     Family History:   No family history on file.  Medications Prior to Admission:  Medications Prior to Admission: insulin regular (HUMULIN R;NOVOLIN R) 100 UNIT/ML injection, Inject 10 Units into the skin daily (before dinner)  insulin regular (HUMULIN R;NOVOLIN R) 100 UNIT/ML injection, Inject 15 Units into the skin every morning (before breakfast)  insulin NPH (HUMULIN N;NOVOLIN N) 100 UNIT/ML injection pen, Inject 15 Units into the skin daily (before dinner)  insulin NPH (HUMULIN N;NOVOLIN N) 100 UNIT/ML injection pen, Inject 25 Units into the skin every morning (before breakfast)  Lancets MISC, 1 each by Does not apply route 4 times daily  blood glucose monitor strips, Test 4  times a day & as needed for symptoms of irregular blood glucose. Dispense sufficient amount for indicated testing frequency plus additional to accommodate PRN testing needs. Prenatal MV-Min-Fe Fum-FA-DHA (PRENATAL 1 PO), Take 1 tablet by mouth daily    REVIEW OF SYSTEMS:    Severe pain with CTXs    PHYSICAL EXAM:  Vitals:    12/23/22 0630 12/23/22 0631 12/23/22 0633 12/23/22 0634   BP: (!) 140/63   132/60   Pulse: 99   90   Resp: 20   20   Temp:       TempSrc:       SpO2:  (!) 86% 98%      General appearance:  awake, alert, cooperative, no apparent distress, and appears stated age  Neurologic:  Awake, alert, oriented to name, place and time. Lungs:  No increased work of breathing, good air exchange  Abdomen:  Soft, non tender, gravid, consistent with her gestational age, EFW by Leopold's maneuver was 8 lbs   Fetal heart rate:  Reassuring.   Pelvis:  Adequate pelvis  Cervix: 8/90%/-3, AROM with clear AF  Contraction frequency:  every 2-3 mins  Membranes:  Ruptured clear fluid    Labs: CBC:   Lab Results   Component Value Date/Time    WBC 8.8 12/23/2022 05:25 AM    RBC 4.58 12/23/2022 05:25 AM    HGB 13.0 12/23/2022 05:25 AM    HCT 39.6 12/23/2022 05:25 AM    MCV 86.3 12/23/2022 05:25 AM    MCH 28.4 12/23/2022 05:25 AM    MCHC 32.9 12/23/2022 05:25 AM    RDW 14.4 12/23/2022 05:25 AM     12/23/2022 05:25 AM    MPV 8.4 12/23/2022 05:25 AM       ASSESSMENT AND PLAN:    Labor: Admit, anticipate normal delivery, routine labor orders  Fetus: Reassuring  GBS: No  Other: A2GDM: stable BS  Suspected LGA, was scheduled for CS in 1 week, given advanced labor, she desires MARVIN, understands risk of shoulder dystocia.  Will proceed with cautious MARVIN

## 2022-12-23 NOTE — PROGRESS NOTES
Contraction pain is none. Epidural Yes    VS:   Vitals:    22 0658 22 0725 22 0803 22 0824   BP:  (!) 98/51 (!) 116/58 (!) 155/84   Pulse:  86 93 (!) 181   Resp:  18 18    Temp:       TempSrc:   Oral    SpO2: 100% 97% 97%          FHT: Cat 1    Cervical Exam:   9.5/0 station/vertex        A/P: 28 y.o.  38w5d   Labor: adequate contractions,  procrastinated progress, suspected LGA, watch progress, was scheduled for CS next week, if fails to progress, will proceed with CS.     Fetus: reassuring

## 2022-12-23 NOTE — ANESTHESIA PRE PROCEDURE
Department of Anesthesiology  Preprocedure Note       Name:  Erika De Dios   Age:  28 y.o.  :  1995                                          MRN:  [id number]         Date:  2022      Surgeon: * No surgeons listed *    Procedure: * No procedures listed *    Medications prior to admission:   Prior to Admission medications    Medication Sig Start Date End Date Taking? Authorizing Provider   insulin regular (HUMULIN R;NOVOLIN R) 100 UNIT/ML injection Inject 10 Units into the skin daily (before dinner) 10/21/22   Estephanie Barkley MD   insulin regular (HUMULIN R;NOVOLIN R) 100 UNIT/ML injection Inject 15 Units into the skin every morning (before breakfast) 10/22/22   Estephanie Barkley MD   insulin NPH (HUMULIN N;NOVOLIN N) 100 UNIT/ML injection pen Inject 15 Units into the skin daily (before dinner) 10/21/22   Estephanie Barkley MD   insulin NPH (HUMULIN N;NOVOLIN N) 100 UNIT/ML injection pen Inject 25 Units into the skin every morning (before breakfast) 10/22/22   Estephanie Barkley MD   Lancets MISC 1 each by Does not apply route 4 times daily 10/21/22   Estephanie Barkley MD   blood glucose monitor strips Test 4  times a day & as needed for symptoms of irregular blood glucose. Dispense sufficient amount for indicated testing frequency plus additional to accommodate PRN testing needs.  10/21/22   Estephanie Barkley MD   Prenatal MV-Min-Fe Fum-FA-DHA (PRENATAL 1 PO) Take 1 tablet by mouth daily    Historical Provider, MD       Current medications:    Current Facility-Administered Medications   Medication Dose Route Frequency Provider Last Rate Last Admin    ondansetron (ZOFRAN) injection 4 mg  4 mg IntraVENous Q6H PRN Marisol Barba MD        methylergonovine (METHERGINE) injection 200 mcg  200 mcg IntraMUSCular PRN Marisol Barba MD        carboprost (HEMABATE) injection 250 mcg  250 mcg IntraMUSCular PRN Marisol Barba MD        miSOPROStol (CYTOTEC) tablet 800 mcg  800 mcg Rectal PRN Hilary Lakhwinder Lujan MD        tranexamic acid (CYKLOKAPRON) 1,000 mg in sodium chloride 0.9 % 60 mL IVPB  1,000 mg IntraVENous Once PRN Lilly Denton MD        acetaminophen (TYLENOL) tablet 650 mg  650 mg Oral Q4H PRN Hilary Lujan MD        benzocaine-menthol (DERMOPLAST) 20-0.5 % spray   Topical PRN Lilly Denton MD        docusate sodium (COLACE) capsule 100 mg  100 mg Oral BID Lilly Denton MD        0.9 % sodium chloride infusion   IntraVENous Continuous Lilly Denton MD 75 mL/hr at 12/23/22 0639 New Bag at 12/23/22 0639    lidocaine PF 1 % injection             fentaNYL 2mcg/mL bupivacaine 0.125% in sodium chloride 0.9% 250mL (OB) epidural  12 mL/hr Epidural Continuous Hilary Lujan MD         Facility-Administered Medications Ordered in Other Encounters   Medication Dose Route Frequency Provider Last Rate Last Admin    dexmedetomidine (PRECEDEX) injection   Intrathecal PRN Shade Prince, APRN - CRNA   5 mcg at 12/23/22 3811    Lidocaine-EPINEPHrine 1.5 %-1:[id number]   Epidural PRN Shade Keens, APRN - CRNA   3 mL at 12/23/22 7980    lidocaine 1 % injection   Subcuticular PRN Shade Keens, APRN - CRNA   3 mL at 12/23/22 2715    fentaNYL 2mcg/mL bupivacaine 0.125% in sodium chloride 0.9% 250mL (OB) epidural   Epidural Continuous PRN Harlingen Keens, APRN - CRNA 12 mL/hr at 12/23/22 0631 12 mL/hr at 12/23/22 0631       Allergies:  No Known Allergies    Problem List:    Patient Active Problem List   Diagnosis Code    Gestational hypertension affecting first pregnancy O13.9    Threatened labor at term O50.10       Past Medical History:        Diagnosis Date    Anemia     Diabetes mellitus ([facility] [address]        Past Surgical History:  No past surgical history on file. Social History:    Social History     Tobacco Use    Smoking status: Never    Smokeless tobacco: Never   Substance Use Topics    Alcohol use:  Yes                                Counseling given: Not Answered      Vital Signs (Current):   Vitals:    12/23/22 0630 12/23/22 0631 12/23/22 0633 12/23/22 0634   BP: (!) 140/63   132/60   Pulse: 99   90   Resp: 20   20   Temp:       TempSrc:       SpO2:  (!) 86% 98%                                               BP Readings from Last 3 Encounters:   12/23/22 132/60   10/21/22 115/66   10/05/22 128/71       NPO Status:                                                                                 BMI:   Wt Readings from Last 3 Encounters:   10/20/22 250 lb (113.4 kg)   10/05/22 249 lb 12.8 oz (113.3 kg)   08/05/22 240 lb (108.9 kg)     There is no height or weight on file to calculate BMI.    CBC:   Lab Results   Component Value Date/Time    WBC 8.8 12/23/2022 05:25 AM    RBC 4.58 12/23/2022 05:25 AM    HGB 13.0 12/23/2022 05:25 AM    HCT 39.6 12/23/2022 05:25 AM    MCV 86.3 12/23/2022 05:25 AM    RDW 14.4 12/23/2022 05:25 AM     12/23/2022 05:25 AM       CMP:   Lab Results   Component Value Date/Time     10/20/2022 06:47 PM    K 4.0 10/20/2022 06:47 PM     10/20/2022 06:47 PM    CO2 19 10/20/2022 06:47 PM    BUN 7 10/20/2022 06:47 PM    CREATININE <0.5 10/20/2022 06:47 PM    GFRAA >60 08/06/2022 01:14 AM    AGRATIO 1.3 10/20/2022 06:47 PM    LABGLOM >60 10/20/2022 06:47 PM    GLUCOSE 122 10/20/2022 06:47 PM    PROT 6.7 10/20/2022 06:47 PM    CALCIUM 10.0 10/20/2022 06:47 PM    BILITOT 0.3 10/20/2022 06:47 PM    ALKPHOS 119 10/20/2022 06:47 PM    AST 24 10/20/2022 06:47 PM    ALT 14 10/20/2022 06:47 PM       POC Tests: No results for input(s): POCGLU, POCNA, POCK, POCCL, POCBUN, POCHEMO, POCHCT in the last 72 hours.     Coags: No results found for: PROTIME, INR, APTT    HCG (If Applicable):   Lab Results   Component Value Date    PREGTESTUR negative 12/31/2020        ABGs: No results found for: PHART, PO2ART, XKJ9CRC, XNM5VMC, BEART, V2ZQEACA     Type & Screen (If Applicable):  No results found for: LABABO, LABRH    Drug/Infectious Status (If Applicable):  No results found for: HIV, HEPCAB    COVID-19 Screening (If Applicable): No results found for: COVID19        Anesthesia Evaluation  Patient summary reviewed and Nursing notes reviewed no history of anesthetic complications:   Airway: Mallampati: III  TM distance: >3 FB   Neck ROM: full  Mouth opening: > = 3 FB   Dental: normal exam         Pulmonary:Negative Pulmonary ROS and normal exam                               Cardiovascular:                      Neuro/Psych:   Negative Neuro/Psych ROS              GI/Hepatic/Renal:   (+) GERD:,           Endo/Other:    (+) DiabetesType II DM, , .                 Abdominal:   (+) obese,           Vascular: negative vascular ROS. Other Findings:           Anesthesia Plan      epidural     ASA 2     (BP: 132/60  Lab Results       Component                Value               Date                       WBC                      8.8                 12/23/2022                 HGB                      13.0                12/23/2022                 HCT                      39.6                12/23/2022                 MCV                      86.3                12/23/2022                 PLT                      308                 12/23/2022            Patient request epidural for labor and delivery. Discussed procedure and risks including but not limited to changes in VSS, allergic reaction, infection, intravascular injection, paresthesia, n/v, severe headache, temporary or permanent rare neurologic sequelae and failed block. All questions answered. Patient agreed to proceed)        Anesthetic plan and risks discussed with patient. Use of blood products discussed with patient whom. Plan discussed with CRNA.     Attending anesthesiologist reviewed and agrees with Preprocedure content                FRITZ Rodriguez - CRNA   12/23/2022

## 2022-12-23 NOTE — OP NOTE
Operative Note      Patient: Meghan Cho  YOB: 1995  MRN: [id number]    Date of Procedure: 2022     Pre-operative Diagnosis:   Term pregnancy, A2GDM, maternal obesity, suspected LGA, arrest of fetal descent, POP fetal intolerance to labor    Post-operative Diagnosis:  Same    Procedure:  primary low transverse  section    Surgeon:  Dr Ken Valdez    Anesthesia:  Epidural anesthesia    Tubes, uterus, ovaries:  normal    Estimated blood loss:  450ml    Specimens:  none    Cord Gases: yes    Drains: no    Complications:  none    Information for the patient's :  Oumar Matos [[id number]]        Condition:    Infant stable, transfer to General Care Nursery  Mother stable, transfer to post anesthesia recovery    OPERATIVE INDICATIONS:  The patient is a 28 y.o., Black female   at 44w7d who is admitted for Mx of active labor at 8 cm, protracted progress to second stage over 7 hours and then failure to descend, fetal intolerance when we attempted to push, station remained at 0. Decision made to proceed with CS. Sascha Davis She was adequately counseled regarding all risks, benefits and alternatives and consented for the procedure. OPERATIVE FINDINGS:   Information for the patient's :  Oumar Matos [[id number]]    . Delivered via low transverse  section. Amniotic fluid was clear. Placenta was  healthy. Uterus, tubes and ovaries were normal.  One loop of nuchal cord was released    OPERATIVE DETAILS:  The patient was taken to the operating room and given epidural redose. A fetal pillow was placed. She was placed in the supine position with 15 degree Left lateral tilt. The surgical site was prepped and draped and a Rawls catheter was placed. A Pfannenstiel  incision was made and opening was done in a routine fashion. After opening the peritoneal cavity, lower uterine segment was inspected and found to be adequate.   A bladder flap was now created and bladder was dissected down using Metzenbaum scissors. At this point a vertical incision was made in the lower uterine segment and extended adequately on either side. The fetus was delivered from its Cephalic presentation without any difficulty. Cord was clamped and cut and the baby was handed over to the nursing staff. The placenta with its membranes was now removed. The uterus was now cleared of all membranes and blood clots. The uterine incision was closed in 2 layers first with 0 Vicryl continuous locking and second with an imbricating layer. The visceral peritoneum was now closed with 2-0 Vicryl. The parietal peritoneum was closed with 2-0 Vicryl. Hemostasis was confirmed at each level of the procedure. The rectus muscle was now closed with interrupted figure-of-eight sutures with 2-0 Vicryl. The rectus fascia was closed with 0 Vicryl continuous locking with 2 sutures beginning at either end of the incision and meeting in the midline. At this point hemostasis was again confirmed. The subcutaneous tissue was closed with 3-0 Vicryl and skin with Sutures/staples:[id number]}. A dry dressing was applied to the wound. The uterus was massaged and the vagina was emptied of all blood clots. At this point all instrument, needle and sponge counts were correct  x3. The fetal pillow was removed.      Davon Cadena MD         Electronically signed by Davon Cadena MD on 12/23/2022 at 4:41 PM

## 2022-12-23 NOTE — ANESTHESIA PROCEDURE NOTES
Epidural Block    Patient location during procedure: OB  Start time: 12/23/2022 6:23 AM  End time: 12/23/2022 6:40 AM  Reason for block: labor epidural  Staffing  Performed: resident/CRNA   Resident/CRNA: FRITZ Torres CRNA  Epidural  Patient position: sitting  Prep: ChloraPrep  Patient monitoring: continuous pulse ox and frequent blood pressure checks  Approach: midline  Location: L3-4  Injection technique: GEORGE saline  Provider prep: mask and sterile gloves  Needle  Needle type: Tuohy   Needle gauge: 18 G  Needle length: 3.5 in  Needle insertion depth: 9 cm  Catheter type: side hole  Catheter size: 19 G (20 G)  Catheter at skin depth: 15 cm  Test dose: negativeCatheter Secured: tegaderm and tape  Assessment  Sensory level: T10  Hemodynamics: stable  Attempts: 1  Outcomes: uncomplicated and patient tolerated procedure well  Additional Notes  Dural puncture with 25g 5 inch spinal completed. Clear csf noted.  Spinal needle removed and epidural cath placed without difficulty   Preanesthetic Checklist  Completed: patient identified, IV checked, site marked, risks and benefits discussed, surgical/procedural consents, equipment checked, pre-op evaluation, timeout performed, anesthesia consent given, oxygen available and monitors applied/VS acknowledged

## 2022-12-23 NOTE — DISCHARGE SUMMARY
Obstetrical Discharge Form    Gestational Age: 44w7d    Antepartum complications: Term pregnancy, A2GDM, maternal obesity, suspected LGA, arrest of fetal descent, fetal intolerance to labor    Date of Delivery: 22      Type of Delivery:  LTCS    Delivered By:  Dr Levi Bang:      Information for the patient's :  Denise Sawant [[id number]]   APGAR One: N/A   Information for the patient's :  Denise Sawant [[id number]]   APGAR Five: N/A   Information for the patient's :  Denise Sawant [[id number]]   Birth Weight: N/A     Anesthesia: Epidural    Intrapartum complications: None    Postpartum complications: none    Discharge Medication:      Medication List        CONTINUE taking these medications      Lancets Misc  1 each by Does not apply route 4 times daily            STOP taking these medications      blood glucose test strips     insulin  UNIT/ML injection pen  Commonly known as: HUMULIN N;NOVOLIN N     insulin regular 100 UNIT/ML injection  Commonly known as: HUMULIN R;NOVOLIN R            ASK your doctor about these medications      PRENATAL 1 PO              Discharge Condition:  good    Discharge Date: 22    PLAN:  Follow up in 6 weeks for routine PP visit  All questions answered  D/C summary begun at delivery for D/C planning purposes, any delay in discharge from ordered D/C date due to  factors.

## 2022-12-23 NOTE — PROGRESS NOTES
Contraction pain is moderate. Epidural Yes    VS:   Vitals:    22 1255 22 1324 22 1354 22 1423   BP: 134/73 136/70 122/71 134/63   Pulse: (!) 108 (!) 104 (!) 103 (!) 105   Resp: 18 18 18 20   Temp: 98 °F (36.7 °C)      TempSrc: Oral      SpO2: 100% 100% 100% 100%         FHT: Cat 2, repetitive late prolonged decels with pushing    Cervical Exam:   Dilation (cm): 10   Effacement: 100       Station:  (0-+1 with caput per MD)  Direct OP        A/P: 28 y.o.  38w5d   Labor: arrest of second stage of labor, protracted 1st stage, fetal intolerance to labor, will proceed with CS, proc and all r/b/a d/w her and consented.

## 2022-12-24 LAB
BASOPHILS ABSOLUTE: 0.1 K/UL (ref 0–0.2)
BASOPHILS RELATIVE PERCENT: 0.3 %
EOSINOPHILS ABSOLUTE: 0 K/UL (ref 0–0.6)
EOSINOPHILS RELATIVE PERCENT: 0 %
HCT VFR BLD CALC: 32.4 % (ref 36–48)
HEMOGLOBIN: 10.4 G/DL (ref 12–16)
LYMPHOCYTES ABSOLUTE: 0.9 K/UL (ref 1–5.1)
LYMPHOCYTES RELATIVE PERCENT: 4.5 %
MCH RBC QN AUTO: 27.6 PG (ref 26–34)
MCHC RBC AUTO-ENTMCNC: 32 G/DL (ref 31–36)
MCV RBC AUTO: 86 FL (ref 80–100)
MONOCYTES ABSOLUTE: 1.7 K/UL (ref 0–1.3)
MONOCYTES RELATIVE PERCENT: 8.1 %
NEUTROPHILS ABSOLUTE: 18.2 K/UL (ref 1.7–7.7)
NEUTROPHILS RELATIVE PERCENT: 87.1 %
PDW BLD-RTO: 14.1 % (ref 12.4–15.4)
PLATELET # BLD: 264 K/UL (ref 135–450)
PMV BLD AUTO: 8 FL (ref 5–10.5)
RBC # BLD: 3.76 M/UL (ref 4–5.2)
WBC # BLD: 20.9 K/UL (ref 4–11)

## 2022-12-24 PROCEDURE — 1220000000 HC SEMI PRIVATE OB R&B

## 2022-12-24 PROCEDURE — 2580000003 HC RX 258: Performed by: OBSTETRICS & GYNECOLOGY

## 2022-12-24 PROCEDURE — 6360000002 HC RX W HCPCS: Performed by: OBSTETRICS & GYNECOLOGY

## 2022-12-24 PROCEDURE — 85025 COMPLETE CBC W/AUTO DIFF WBC: CPT

## 2022-12-24 PROCEDURE — 6370000000 HC RX 637 (ALT 250 FOR IP): Performed by: OBSTETRICS & GYNECOLOGY

## 2022-12-24 RX ADMIN — SODIUM CHLORIDE, PRESERVATIVE FREE 10 ML: 5 INJECTION INTRAVENOUS at 20:43

## 2022-12-24 RX ADMIN — ACETAMINOPHEN 1000 MG: 500 TABLET ORAL at 16:12

## 2022-12-24 RX ADMIN — KETOROLAC TROMETHAMINE 30 MG: 30 INJECTION, SOLUTION INTRAMUSCULAR; INTRAVENOUS at 04:15

## 2022-12-24 RX ADMIN — DOCUSATE SODIUM 100 MG: 100 CAPSULE, LIQUID FILLED ORAL at 08:44

## 2022-12-24 RX ADMIN — SODIUM CHLORIDE: 9 INJECTION, SOLUTION INTRAVENOUS at 08:45

## 2022-12-24 RX ADMIN — POLYETHYLENE GLYCOL 3350 17 G: 17 POWDER, FOR SOLUTION ORAL at 08:44

## 2022-12-24 RX ADMIN — IBUPROFEN 800 MG: 800 TABLET, FILM COATED ORAL at 20:43

## 2022-12-24 RX ADMIN — DOCUSATE SODIUM 100 MG: 100 CAPSULE, LIQUID FILLED ORAL at 20:43

## 2022-12-24 RX ADMIN — ACETAMINOPHEN 1000 MG: 500 TABLET ORAL at 08:44

## 2022-12-24 RX ADMIN — IBUPROFEN 800 MG: 800 TABLET, FILM COATED ORAL at 12:14

## 2022-12-24 RX ADMIN — ACETAMINOPHEN 1000 MG: 500 TABLET ORAL at 00:14

## 2022-12-24 RX ADMIN — SODIUM CHLORIDE: 9 INJECTION, SOLUTION INTRAVENOUS at 00:16

## 2022-12-24 ASSESSMENT — PAIN SCALES - GENERAL
PAINLEVEL_OUTOF10: 0
PAINLEVEL_OUTOF10: 3

## 2022-12-24 ASSESSMENT — PAIN DESCRIPTION - LOCATION: LOCATION: ABDOMEN

## 2022-12-24 ASSESSMENT — PAIN DESCRIPTION - DESCRIPTORS: DESCRIPTORS: SORE

## 2022-12-24 ASSESSMENT — PAIN DESCRIPTION - ORIENTATION: ORIENTATION: LOWER

## 2022-12-24 NOTE — PLAN OF CARE
Problem: Pain  Goal: Verbalizes/displays adequate comfort level or baseline comfort level  Outcome: Progressing  Flowsheets  Taken 12/23/2022 1851 by Zarina Roberto RN  Verbalizes/displays adequate comfort level or baseline comfort level:   Encourage patient to monitor pain and request assistance   Assess pain using appropriate pain scale   Administer analgesics based on type and severity of pain and evaluate response  Taken 12/23/2022 1651 by Zarina Roberto RN  Verbalizes/displays adequate comfort level or baseline comfort level:   Encourage patient to monitor pain and request assistance   Assess pain using appropriate pain scale   Implement non-pharmacological measures as appropriate and evaluate response  Taken 12/23/2022 1324 by Zarina Roberto RN  Verbalizes/displays adequate comfort level or baseline comfort level:   Encourage patient to monitor pain and request assistance   Assess pain using appropriate pain scale  Taken 12/23/2022 1153 by Zarina Roberto RN  Verbalizes/displays adequate comfort level or baseline comfort level:   Encourage patient to monitor pain and request assistance   Assess pain using appropriate pain scale   Administer analgesics based on type and severity of pain and evaluate response   Implement non-pharmacological measures as appropriate and evaluate response  Taken 12/23/2022 1054 by Zarina Roberto RN  Verbalizes/displays adequate comfort level or baseline comfort level:   Encourage patient to monitor pain and request assistance   Assess pain using appropriate pain scale   Administer analgesics based on type and severity of pain and evaluate response  Taken 12/23/2022 0824 by Zarina Roberto RN  Verbalizes/displays adequate comfort level or baseline comfort level:   Encourage patient to monitor pain and request assistance   Assess pain using appropriate pain scale   Administer analgesics based on type and severity of pain and evaluate response Implement non-pharmacological measures as appropriate and evaluate response   Consider cultural and social influences on pain and pain management     Problem: Postpartum  Goal: Experiences normal postpartum course  Description:  Postpartum OB-Pregnancy care plan goal which identifies if the mother is experiencing a normal postpartum course  Outcome: Progressing  Goal: Appropriate maternal -  bonding  Description:  Postpartum OB-Pregnancy care plan goal which identifies if the mother and  are bonding appropriately  Outcome: Progressing  Goal: Establishment of infant feeding pattern  Description:  Postpartum OB-Pregnancy care plan goal which identifies if the mother is establishing a feeding pattern with their   Outcome: Progressing  Goal: Incisions, wounds, or drain sites healing without S/S of infection  Outcome: Progressing     Problem: Infection - Adult  Goal: Absence of infection at discharge  Outcome: Progressing  Goal: Absence of infection during hospitalization  Outcome: Progressing  Goal: Absence of fever/infection during anticipated neutropenic period  Outcome: Progressing     Problem: Safety - Adult  Goal: Free from fall injury  Outcome: Progressing     Problem: Discharge Planning  Goal: Discharge to home or other facility with appropriate resources  Outcome: Progressing     Problem: Chronic Conditions and Co-morbidities  Goal: Patient's chronic conditions and co-morbidity symptoms are monitored and maintained or improved  Outcome: Progressing

## 2022-12-24 NOTE — PROGRESS NOTES
[facility] post-partum note    Post-partum Day #1    Subjective:  Pain is : Mild  Lochia: staining only  Nausea: Mild  Bowel Movement/Flatus:  yes  Breastfeeding: plans to bottle feed    Objective:Patient Vitals for the past 24 hrs:   BP Temp Temp src Pulse Resp SpO2   12/24/22 0415 112/60 98.1 °F (36.7 °C) Oral 95 18 99 %   12/24/22 0014 108/61 98.7 °F (37.1 °C) Oral 86 16 99 %   12/23/22 2115 -- 99.6 °F (37.6 °C) Axillary -- -- --   12/23/22 2015 (!) 113/55 (!) 101.2 °F (38.4 °C) Oral 91 16 98 %   12/23/22 1851 (!) 107/59 98.4 °F (36.9 °C) Temporal 100 16 98 %   12/23/22 1847 -- -- -- -- -- 96 %   12/23/22 1842 -- -- -- -- -- 97 %   12/23/22 1837 -- -- -- -- -- 97 %   12/23/22 1836 (!) 113/56 -- -- 89 -- --   12/23/22 1832 -- -- -- -- -- 97 %   12/23/22 1831 -- -- -- -- -- 97 %   12/23/22 1827 -- -- -- -- -- 98 %   12/23/22 1822 -- -- -- -- -- 97 %   12/23/22 1821 (!) 112/55 -- -- 98 -- --   12/23/22 1817 -- -- -- -- -- 97 %   12/23/22 1812 -- -- -- -- -- 96 %   12/23/22 1807 -- -- -- -- -- 97 %   12/23/22 1806 (!) 108/55 -- -- 98 -- --   12/23/22 1802 -- -- -- -- -- 96 %   12/23/22 1757 -- -- -- -- -- 97 %   12/23/22 1752 -- -- -- -- -- 98 %   12/23/22 1751 (!) 105/51 -- -- 92 -- --   12/23/22 1736 (!) 105/51 -- -- 94 -- --   12/23/22 1721 (!) 100/47 -- -- (!) 103 -- 98 %   12/23/22 1706 (!) 94/52 -- -- (!) 106 -- --   12/23/22 1651 (!) 125/56 99 °F (37.2 °C) Oral (!) 115 18 98 %   12/23/22 1423 134/63 -- -- (!) 105 20 100 %   12/23/22 1354 122/71 -- -- (!) 103 18 100 %   12/23/22 1324 136/70 -- -- (!) 104 18 100 %   12/23/22 1255 134/73 98 °F (36.7 °C) Oral (!) 108 18 100 %   12/23/22 1153 130/60 -- -- (!) 106 18 99 %   12/23/22 1054 130/60 -- -- 94 16 99 %   12/23/22 1024 (!) 117/56 -- -- 85 18 100 %   12/23/22 0954 (!) 117/56 -- -- 96 16 98 %   12/23/22 0924 (!) 107/53 -- -- 90 -- 98 %   12/23/22 0855 (!) 130/53 -- -- 86 -- --      Abdominal exam: soft, nontender, nondistended, no masses or organomegaly.      Wound: Dressing C/D/I        Lab Results   Component Value Date    WBC 20.9 (H) 12/24/2022    HGB 10.4 (L) 12/24/2022    HCT 32.4 (L) 12/24/2022    MCV 86.0 12/24/2022     12/24/2022          Current Facility-Administered Medications:     lactated ringers infusion, , IntraVENous, Continuous, Hilary FAUSTIN MD    sodium chloride flush 0.9 % injection 5-40 mL, 5-40 mL, IntraVENous, 2 times per day, Joleen FAUSTIN MD    sodium chloride flush 0.9 % injection 5-40 mL, 5-40 mL, IntraVENous, PRN, Hilary FAUSTIN MD    0.9 % sodium chloride infusion, , IntraVENous, PRN, Uday Paez MD, Last Rate: 125 mL/hr at 12/24/22 0524, Rate Change at 12/24/22 0524    carboprost (HEMABATE) injection 250 mcg, 250 mcg, IntraMUSCular, PRN, Joleen FAUSTIN MD    methylergonovine (METHERGINE) injection 200 mcg, 200 mcg, IntraMUSCular, PRN, Joleen FAUSTIN MD    miSOPROStol (CYTOTEC) tablet 800 mcg, 800 mcg, Rectal, PRN, Uday Paez MD    miSOPROStol (CYTOTEC) tablet 200 mcg, 200 mcg, Buccal, PRN, Uday Paez MD    acetaminophen (TYLENOL) tablet 1,000 mg, 1,000 mg, Oral, Q8H, Hilary FAUSTIN MD, 1,000 mg at 12/24/22 0014    ibuprofen (ADVIL;MOTRIN) tablet 800 mg, 800 mg, Oral, Q8H, Hilary FAUSTIN MD    ketorolac (TORADOL) injection 30 mg, 30 mg, IntraVENous, Q8H, Hilary FAUSTIN MD, 30 mg at 12/24/22 0415    oxyCODONE (ROXICODONE) immediate release tablet 5 mg, 5 mg, Oral, Q4H PRN **OR** oxyCODONE (ROXICODONE) immediate release tablet 10 mg, 10 mg, Oral, Q4H PRN, Joleen FAUSTIN MD    famotidine (PEPCID) tablet 20 mg, 20 mg, Oral, BID PRN, Joleen FAUSTIN MD    rho(D) immune globulin (HYPERRHO S/D) injection 300 mcg, 300 mcg, IntraMUSCular, Once, Hilary FAUSTIN MD    ondansetron (ZOFRAN) injection 4 mg, 4 mg, IntraVENous, Q6H PRN, Hilary FAUSTIN MD    ondansetron (ZOFRAN-ODT) disintegrating tablet 8 mg, 8 mg, Oral, Q8H PRN, Joleen Rangel MD RAMIN    diphenhydrAMINE (BENADRYL) injection 25 mg, 25 mg, IntraVENous, Q6H PRN, Morteza FAUSTIN MD    simethicone (MYLICON) chewable tablet 80 mg, 80 mg, Oral, Q6H PRN, Morteza FAUSTIN MD    docusate sodium (COLACE) capsule 100 mg, 100 mg, Oral, BID, Hilary FAUSTIN MD    polyethylene glycol (GLYCOLAX) packet 17 g, 17 g, Oral, Daily, Hilary FAUSTIN MD    magnesium hydroxide (MILK OF MAGNESIA) 400 MG/5ML suspension 30 mL, 30 mL, Oral, Daily PRN, Morteza FAUSTIN MD    bisacodyl (DULCOLAX) suppository 10 mg, 10 mg, Rectal, Daily PRN, Boaz Negro MD    lansinoh lanolin ointment, , Topical, Q1H PRN, Morteza FAUSTIN MD    ferrous sulfate (IRON 325) tablet 325 mg, 325 mg, Oral, Daily with breakfast, Hilary FAUSTIN MD    measles, mumps & rubella vaccine (MMR) injection 0.5 mL, 0.5 mL, SubCUTAneous, Prior to discharge, Boaz Negro MD    Tetanus-Diphth-Acell Pertussis (BOOSTRIX) injection 0.5 mL, 0.5 mL, IntraMUSCular, Prior to discharge, Boaz Negro MD     Assessment/Plan:      Continue Postpartum care  Discharge today: no  Follow up: 2weeks    D/C, saline lock IV  Advance care with plan for discharge tomorrow

## 2022-12-24 NOTE — FLOWSHEET NOTE
Recovery complete, patient stable. Pericare completed with new peripads on patient. Gown changed and dirty linens removed. Rawls emptied. Patient transferred to postpartum 4409 via bed by myself and Lori Cleary RN and settled into room. Pt oriented to folder and postpartum care. Oriented to call light, phone and ordering meals. RN's name and phone number posted for pt. Siderails up x2. Pt oriented to equipment. Report to EDMUNDO Abernathy RN,  and care assumed at this time. Pt included in discussion and all questions answered. Prescriptions for Motrin, Tylenol and Roxicodone were placed in  folder. Uses and side effects explained to patient, she verbalized understanding. Patient states to not have any further questions at this time.

## 2022-12-24 NOTE — ANESTHESIA POSTPROCEDURE EVALUATION
Department of Anesthesiology  Postprocedure Note    Patient: Vanessa Cummings  MRN: [id number]  YOB: 1995  Date of evaluation: 2022      Procedure Summary     Date: 22 Room / Location: [facility] L&D OR [address] [place] [address]    Anesthesia Start: 8276 Anesthesia Stop: 828    Procedures:        SECTION (Abdomen)      Labor Analgesia Diagnosis:       Failure to progress in second stage of labor      (Failure to progress in second stage of labor [O62.2])    Surgeons: Alejandra Long MD Responsible Provider: Fabián Steel MD    Anesthesia Type: Epidural ASA Status: 2          Anesthesia Type: Epidural    Ravin Phase I: Ravin Score: 8    Ravin Phase II: Ravin Score: 10      Anesthesia Post Evaluation    Patient location during evaluation: bedside  Patient participation: complete - patient participated  Level of consciousness: awake and alert  Pain score: 0  Airway patency: patent  Nausea & Vomiting: no vomiting and no nausea  Complications: no  Cardiovascular status: hemodynamically stable  Respiratory status: acceptable, nonlabored ventilation, room air and spontaneous ventilation  Hydration status: stable  Multimodal analgesia pain management approach    Patient s/p epidural for L&D and C/S. Pt denies residual numbness post block. Patient is ambulating and voiding without difficulty. Patient denies back pain, headache, paresthesias, n/v or pruritus. Epidural site is free of signs of infection.

## 2022-12-25 VITALS
OXYGEN SATURATION: 99 % | DIASTOLIC BLOOD PRESSURE: 62 MMHG | SYSTOLIC BLOOD PRESSURE: 112 MMHG | RESPIRATION RATE: 18 BRPM | TEMPERATURE: 98 F | HEART RATE: 98 BPM

## 2022-12-25 PROCEDURE — 6370000000 HC RX 637 (ALT 250 FOR IP): Performed by: OBSTETRICS & GYNECOLOGY

## 2022-12-25 RX ADMIN — ACETAMINOPHEN 1000 MG: 500 TABLET ORAL at 00:03

## 2022-12-25 RX ADMIN — POLYETHYLENE GLYCOL 3350 17 G: 17 POWDER, FOR SOLUTION ORAL at 08:05

## 2022-12-25 RX ADMIN — DOCUSATE SODIUM 100 MG: 100 CAPSULE, LIQUID FILLED ORAL at 08:04

## 2022-12-25 RX ADMIN — IBUPROFEN 800 MG: 800 TABLET, FILM COATED ORAL at 12:34

## 2022-12-25 RX ADMIN — ACETAMINOPHEN 1000 MG: 500 TABLET ORAL at 08:04

## 2022-12-25 RX ADMIN — IBUPROFEN 800 MG: 800 TABLET, FILM COATED ORAL at 05:02

## 2022-12-25 ASSESSMENT — PAIN DESCRIPTION - ORIENTATION: ORIENTATION: LOWER

## 2022-12-25 ASSESSMENT — PAIN SCALES - GENERAL
PAINLEVEL_OUTOF10: 0
PAINLEVEL_OUTOF10: 5
PAINLEVEL_OUTOF10: 0
PAINLEVEL_OUTOF10: 5

## 2022-12-25 ASSESSMENT — PAIN DESCRIPTION - DESCRIPTORS: DESCRIPTORS: SORE

## 2022-12-25 ASSESSMENT — PAIN DESCRIPTION - LOCATION
LOCATION: ABDOMEN
LOCATION: ABDOMEN

## 2022-12-25 NOTE — PROGRESS NOTES
[facility] post-partum note    Post-partum Day #2    Subjective:    Doing well, tolerating PO, flatus passed, voiding, ambulating, tolerating PO, appropriate pain management  Lochia: Normal    Objective:  Vitals:    12/24/22 1210 12/24/22 1605 12/25/22 0003 12/25/22 0800   BP: (!) 98/52 (!) 91/54 110/62 112/62   Pulse: (!) 105 96 (!) 108 98   Resp: 18 16 18 18   Temp: 97.1 °F (36.2 °C) 97.5 °F (36.4 °C) 98 °F (36.7 °C) 98 °F (36.7 °C)   TempSrc: Oral Oral Oral Oral   SpO2: 98% 98% 98% 99%       Physical Examination:  Appears well  Uterus: Firm, NT, BS+, I=CDI  Calves: NT    Labs:    Recent Labs     12/23/22  0525 12/24/22  0415   WBC 8.8 20.9*   HGB 13.0 10.4*   HCT 39.6 32.4*    264     No results for input(s): NA, K, CL, CO2, BUN, CREATININE, CALCIUM, AST, ALT in the last 72 hours.     Invalid input(s): NATALIA      Current Facility-Administered Medications:     lactated ringers infusion, , IntraVENous, Continuous, Hilary FAUSTIN MD    sodium chloride flush 0.9 % injection 5-40 mL, 5-40 mL, IntraVENous, 2 times per day, Edith FAUSTIN MD, 10 mL at 12/24/22 2043    sodium chloride flush 0.9 % injection 5-40 mL, 5-40 mL, IntraVENous, PRN, Hilary FAUSTIN MD    0.9 % sodium chloride infusion, , IntraVENous, PRN, Anu Thomas MD, Stopped at 12/24/22 1024    carboprost (HEMABATE) injection 250 mcg, 250 mcg, IntraMUSCular, PRN, Edith FAUSTIN MD    methylergonovine (METHERGINE) injection 200 mcg, 200 mcg, IntraMUSCular, PRN, Hilary FAUSTIN MD    miSOPROStol (CYTOTEC) tablet 800 mcg, 800 mcg, Rectal, PRN, Hilary FAUSTIN MD    miSOPROStol (CYTOTEC) tablet 200 mcg, 200 mcg, Buccal, PRN, Edith FAUSTIN MD    acetaminophen (TYLENOL) tablet 1,000 mg, 1,000 mg, Oral, Q8H, Hilary FAUSTIN MD, 1,000 mg at 12/25/22 0804    ibuprofen (ADVIL;MOTRIN) tablet 800 mg, 800 mg, Oral, Q8H, Hilary FAUSTIN MD, 800 mg at 12/25/22 0502    oxyCODONE (ROXICODONE) immediate release tablet 5 mg, 5 mg, Oral, Q4H PRN **OR** oxyCODONE (ROXICODONE) immediate release tablet 10 mg, 10 mg, Oral, Q4H PRN, Gabino FAUSTIN MD    famotidine (PEPCID) tablet 20 mg, 20 mg, Oral, BID PRN, Gabino FAUSTIN MD    rho(D) immune globulin (HYPERRHO S/D) injection 300 mcg, 300 mcg, IntraMUSCular, Once, Hilary FAUSTIN MD    ondansetron (ZOFRAN) injection 4 mg, 4 mg, IntraVENous, Q6H PRN, Hilary FAUSTIN MD    ondansetron (ZOFRAN-ODT) disintegrating tablet 8 mg, 8 mg, Oral, Q8H PRN, Gabino FAUSTIN MD    diphenhydrAMINE (BENADRYL) injection 25 mg, 25 mg, IntraVENous, Q6H PRN, Gabino FAUSTIN MD    simethicone (MYLICON) chewable tablet 80 mg, 80 mg, Oral, Q6H PRN, Gabino FAUSTIN MD    docusate sodium (COLACE) capsule 100 mg, 100 mg, Oral, BID, Gabino FAUSTIN MD, 100 mg at 12/25/22 0804    polyethylene glycol (GLYCOLAX) packet 17 g, 17 g, Oral, Daily, Hilary FAUSTIN MD, 17 g at 12/25/22 0805    magnesium hydroxide (MILK OF MAGNESIA) 400 MG/5ML suspension 30 mL, 30 mL, Oral, Daily PRN, Gabino FAUSTIN MD    bisacodyl (DULCOLAX) suppository 10 mg, 10 mg, Rectal, Daily PRN, Jaycee Zhou MD    lansinoh lanolin ointment, , Topical, Q1H PRN, Gabino FAUSTIN MD    ferrous sulfate (IRON 325) tablet 325 mg, 325 mg, Oral, Daily with breakfast, Hilary FAUSTIN MD    measles, mumps & rubella vaccine (MMR) injection 0.5 mL, 0.5 mL, SubCUTAneous, Prior to discharge, Jaycee Zhou MD    Tetanus-Diphth-Acell Pertussis (BOOSTRIX) injection 0.5 mL, 0.5 mL, IntraMUSCular, Prior to discharge, Jaycee Zhou MD     Assessment/Plan:      Post Partum/PO: Continue Postpartum care  Desires to go home today  OK to DC

## 2022-12-25 NOTE — DISCHARGE INSTRUCTIONS
Thank you for the opportunity to care for you and your family. Follow-up with your OB doctor in 6 weeks. Call the office to schedule an appointment. We hope that you are happy with the care we provided during your stay in the [facility]. We want to ensure that you have the help that you need when you leave the hospital. If there is anything that we can assist you with please let us know. Breastfeeding mothers may contact our lactation specialists with any problems or questions. [facility] lactation services phone number is [phone]. Leave a message and your call will be returned. Please refer to the information provided in the postpartum care booklet. The following are warning signs to remember. CALL YOUR DOCTOR IF EXPERIENCING ANY OF THE FOLLOWIN. If you feel you are not getting better or you are concerned. 2.  If you develop a headache, not resolved with your usual interventions. 3.  If you have changes in your vision, (blurring, blind spots, flashes of light, squiggly lines or loss of vision.)  4. If you have pain in your abdomen, up by your ribs, or nausea and vomiting. 5.  New shortness of breath or chest pain. 6.  If you have been instructed by your doctor to monitor you blood pressures, call for blood pressure over 160/100.  7.  Swelling is normal after delivery. If swelling is increasing, especially in your hands and face, along with one of the other symptoms, call your doctor.      Call 911 if you have:  Chest pain or pressure  Shortness of breath, even at rest  Thoughts of hurting yourself or others  Seizures    Call your healthcare provider if you have:  Temperature of 100.4 degrees or higher  Stitches that are not healing             --Swelling, bleeding, drainage, foul odor, redness or warmth in/around your               staples, or incision (scar)               -- Bad smelling blood or discharge from the vagina  Vaginal bleeding that has increased --Soaking through one pad in an hour             --You are passing clots larger than the size of a lemon. Red, warm tender area(s) in your breast or calf. Headache that does not get better, even after taking medicine; or headache with vision changes    Remember to notify all healthcare providers from your date of delivery up to one year after birth! CARING FOR YOURSELF    DIET/ACTIVITY  Eat a well balanced diet focusing on food high in fiber and protein. Drink plenty of fluids, especially water. To avoid constipation you may take a mild stool softener as recommended by your doctor or midwife. Gradually increase your activity. Resume an exercise regime only after being advised by your doctor or midwife. When sitting or lying down, keep your legs elevated to reduce swelling. Avoid lifting anything heavier than your baby or a gallon of milk. Avoid driving for two weeks or while taking narcotics. No sexual intercourse for 6 weeks, or until advised by your OB provider. Nothing in the vagina: intercourse, tampons or douching. Be prepared to discuss family planning at your follow up OB visit. If you feel tired and have a lack of energy, you may continue to take your prenatal vitamins. Nap when your baby naps to catch up on sleep. EMOTIONS  You may feel chang, sad, teary and overwhelmed. Contact your OB provider if you think you may be showing signs of postpartum depression. If your baby will not stop crying, contact another adult to help or place the baby in its crib on its back and take a break. Never shake your baby! INCISIONAL/RENEE CARE  Clean your incision in the shower with mild soap. After shower pat the incision area dry and allow it to be open to the air. If used, steri strips should be removed by 2 weeks. If you are discharged with staples in place, call your OB provider's office to schedule removal.  Vaginal bleeding will decrease in amount over the next few weeks.  Cleanse your perineum from front to back using the theresa bottle, after toileting, until bleeding stops. You will notice that as your activity increases, your flow may also increase. This is a sign that you need to rest more often. Call your OB provider if you are saturating more than one maxi pad in an hour and rest does not help. BREAST CARE    FOR BREASTFEEDING MOMS:  If you become engorged, feeding may be more difficult or painful for 1 to 2 days. You may find it helpful to hand express some milk so that the infant can latch more easily. While breastfeeding continue to take your prenatal vitamins as directed. Refer to the breastfeeding information in the discharge binder. OTHER REASONS TO CALL YOUR DOCTOR  Your abdomen is tender to touch or you have pain that cannot be relieved. Flu-like symptoms such as achy muscles and joints or you are experiencing extreme weakness or dizziness. Persistent burning or increased urgency in urination. LITERATURE PROVIDED  For Moms and Those who Care about Them. Your 's Healthy Start, [address]. Breastfeeding Best for [employer], Best for Mom. [address] Information about Universal Hearing Screening. Controlling Pain. I have received the educational material listed above,  The  Channel has provided me with the opportunity to view instructional videos pertaining to infant care and the care of myself. I verify that I have received the above information and have no further questions and feel confident to care for myself and my baby. For more information about postpartum care or baby care and feeding, create a [facility] My Chart account, sign in and search using the magnifying glass and type in postpartum, breastfeeding or formula feeding. [email or web address]

## 2022-12-25 NOTE — FLOWSHEET NOTE
Postpartum and infant care teaching completed and forms signed by patient. Copy witnessed by RN and given to patient. Prescriptions given if applicable. Patient plans to follow-up with [facility] Provider as instructed. Patient verbalizes understanding of discharge instructions and denies further questions. ID bands checked. Mother's ID band and one of baby's ID bands removed and taped to footprint sheet, signed by patient and witnessed by RN. Patient discharged in stable condition accompanied by family/guardian. Discharged in wheelchair, holding baby in arms.

## 2024-06-18 ENCOUNTER — OFFICE VISIT (OUTPATIENT)
Dept: PRIMARY CARE CLINIC | Age: 29
End: 2024-06-18
Payer: MEDICAID

## 2024-06-18 ENCOUNTER — HOSPITAL ENCOUNTER (OUTPATIENT)
Dept: GENERAL RADIOLOGY | Age: 29
Discharge: HOME | End: 2024-06-18
Payer: MEDICAID

## 2024-06-18 VITALS
HEART RATE: 86 BPM | TEMPERATURE: 97.8 F | OXYGEN SATURATION: 98 % | WEIGHT: 254.4 LBS | SYSTOLIC BLOOD PRESSURE: 130 MMHG | BODY MASS INDEX: 39.93 KG/M2 | HEIGHT: 67 IN | DIASTOLIC BLOOD PRESSURE: 86 MMHG

## 2024-06-18 DIAGNOSIS — R73.9 HYPERGLYCEMIA: ICD-10-CM

## 2024-06-18 DIAGNOSIS — R53.83 OTHER FATIGUE: Primary | ICD-10-CM

## 2024-06-18 DIAGNOSIS — R10.13 EPIGASTRIC PAIN: ICD-10-CM

## 2024-06-18 DIAGNOSIS — M54.9 UPPER BACK PAIN ON RIGHT SIDE: ICD-10-CM

## 2024-06-18 DIAGNOSIS — H53.8 BLURRY VISION: ICD-10-CM

## 2024-06-18 DIAGNOSIS — R53.83 OTHER FATIGUE: ICD-10-CM

## 2024-06-18 LAB
ALBUMIN SERPL-MCNC: 4.3 G/DL (ref 3.4–5)
ALBUMIN/GLOB SERPL: 1.4 {RATIO} (ref 1.1–2.2)
ALP SERPL-CCNC: 84 U/L (ref 40–129)
ALT SERPL-CCNC: 15 U/L (ref 10–40)
ANION GAP SERPL CALCULATED.3IONS-SCNC: 12 MMOL/L (ref 3–16)
AST SERPL-CCNC: 15 U/L (ref 15–37)
BASOPHILS # BLD: 0 K/UL (ref 0–0.2)
BASOPHILS NFR BLD: 0.6 %
BILIRUB SERPL-MCNC: 0.3 MG/DL (ref 0–1)
BILIRUB UR QL STRIP.AUTO: NEGATIVE
BUN SERPL-MCNC: 9 MG/DL (ref 7–20)
CALCIUM SERPL-MCNC: 9.5 MG/DL (ref 8.3–10.6)
CHLORIDE SERPL-SCNC: 105 MMOL/L (ref 99–110)
CLARITY UR: CLEAR
CO2 SERPL-SCNC: 25 MMOL/L (ref 21–32)
COLOR UR: YELLOW
CREAT SERPL-MCNC: 0.7 MG/DL (ref 0.6–1.1)
DEPRECATED RDW RBC AUTO: 13.8 % (ref 12.4–15.4)
EOSINOPHIL # BLD: 0.1 K/UL (ref 0–0.6)
EOSINOPHIL NFR BLD: 1.3 %
FERRITIN SERPL IA-MCNC: 35.9 NG/ML (ref 15–150)
FOLATE SERPL-MCNC: 7.36 NG/ML (ref 4.78–24.2)
GFR SERPLBLD CREATININE-BSD FMLA CKD-EPI: >90 ML/MIN/{1.73_M2}
GLUCOSE SERPL-MCNC: 105 MG/DL (ref 70–99)
GLUCOSE UR STRIP.AUTO-MCNC: NEGATIVE MG/DL
HCT VFR BLD AUTO: 34.4 % (ref 36–48)
HGB BLD-MCNC: 11.5 G/DL (ref 12–16)
HGB UR QL STRIP.AUTO: NEGATIVE
IRON SATN MFR SERPL: 18 % (ref 15–50)
IRON SERPL-MCNC: 56 UG/DL (ref 37–145)
KETONES UR STRIP.AUTO-MCNC: NEGATIVE MG/DL
LEUKOCYTE ESTERASE UR QL STRIP.AUTO: NEGATIVE
LIPASE SERPL-CCNC: 19 U/L (ref 13–60)
LYMPHOCYTES # BLD: 2 K/UL (ref 1–5.1)
LYMPHOCYTES NFR BLD: 31.1 %
MCH RBC QN AUTO: 26.5 PG (ref 26–34)
MCHC RBC AUTO-ENTMCNC: 33.3 G/DL (ref 31–36)
MCV RBC AUTO: 79.6 FL (ref 80–100)
MONOCYTES # BLD: 0.4 K/UL (ref 0–1.3)
MONOCYTES NFR BLD: 6.7 %
NEUTROPHILS # BLD: 3.9 K/UL (ref 1.7–7.7)
NEUTROPHILS NFR BLD: 60.3 %
NITRITE UR QL STRIP.AUTO: NEGATIVE
PH UR STRIP.AUTO: 6 [PH] (ref 5–8)
PLATELET # BLD AUTO: 405 K/UL (ref 135–450)
PMV BLD AUTO: 7.9 FL (ref 5–10.5)
POTASSIUM SERPL-SCNC: 4.3 MMOL/L (ref 3.5–5.1)
PROT SERPL-MCNC: 7.4 G/DL (ref 6.4–8.2)
PROT UR STRIP.AUTO-MCNC: NEGATIVE MG/DL
RBC # BLD AUTO: 4.32 M/UL (ref 4–5.2)
SODIUM SERPL-SCNC: 142 MMOL/L (ref 136–145)
SP GR UR STRIP.AUTO: 1.02 (ref 1–1.03)
TIBC SERPL-MCNC: 314 UG/DL (ref 260–445)
TSH SERPL DL<=0.005 MIU/L-ACNC: 3.65 UIU/ML (ref 0.27–4.2)
UA COMPLETE W REFLEX CULTURE PNL UR: NORMAL
UA DIPSTICK W REFLEX MICRO PNL UR: NORMAL
URN SPEC COLLECT METH UR: NORMAL
UROBILINOGEN UR STRIP-ACNC: 0.2 E.U./DL
VIT B12 SERPL-MCNC: 1497 PG/ML (ref 211–911)
WBC # BLD AUTO: 6.5 K/UL (ref 4–11)

## 2024-06-18 PROCEDURE — 72072 X-RAY EXAM THORAC SPINE 3VWS: CPT

## 2024-06-18 PROCEDURE — 99214 OFFICE O/P EST MOD 30 MIN: CPT | Performed by: STUDENT IN AN ORGANIZED HEALTH CARE EDUCATION/TRAINING PROGRAM

## 2024-06-18 RX ORDER — OMEPRAZOLE 20 MG/1
20 CAPSULE, DELAYED RELEASE ORAL
Qty: 30 CAPSULE | Refills: 0 | Status: SHIPPED | OUTPATIENT
Start: 2024-06-18

## 2024-06-18 SDOH — ECONOMIC STABILITY: FOOD INSECURITY: WITHIN THE PAST 12 MONTHS, YOU WORRIED THAT YOUR FOOD WOULD RUN OUT BEFORE YOU GOT MONEY TO BUY MORE.: NEVER TRUE

## 2024-06-18 SDOH — ECONOMIC STABILITY: INCOME INSECURITY: HOW HARD IS IT FOR YOU TO PAY FOR THE VERY BASICS LIKE FOOD, HOUSING, MEDICAL CARE, AND HEATING?: NOT VERY HARD

## 2024-06-18 SDOH — ECONOMIC STABILITY: HOUSING INSECURITY
IN THE LAST 12 MONTHS, WAS THERE A TIME WHEN YOU DID NOT HAVE A STEADY PLACE TO SLEEP OR SLEPT IN A SHELTER (INCLUDING NOW)?: NO

## 2024-06-18 SDOH — ECONOMIC STABILITY: FOOD INSECURITY: WITHIN THE PAST 12 MONTHS, THE FOOD YOU BOUGHT JUST DIDN'T LAST AND YOU DIDN'T HAVE MONEY TO GET MORE.: NEVER TRUE

## 2024-06-18 ASSESSMENT — PATIENT HEALTH QUESTIONNAIRE - PHQ9
2. FEELING DOWN, DEPRESSED OR HOPELESS: NOT AT ALL
SUM OF ALL RESPONSES TO PHQ QUESTIONS 1-9: 0
SUM OF ALL RESPONSES TO PHQ QUESTIONS 1-9: 0
1. LITTLE INTEREST OR PLEASURE IN DOING THINGS: NOT AT ALL
SUM OF ALL RESPONSES TO PHQ9 QUESTIONS 1 & 2: 0
SUM OF ALL RESPONSES TO PHQ QUESTIONS 1-9: 0
SUM OF ALL RESPONSES TO PHQ QUESTIONS 1-9: 0

## 2024-06-18 NOTE — PROGRESS NOTES
Robin Childress (:  1995) is a 29 y.o. female,Established patient, here for evaluation of the following chief complaint(s):  Blood Sugar Problem (Follow up after gestational diabetes. /Concern for fatigue, double vision, burning tingling in legs. Intermittent for 5-6 months.) and Back Pain (Upper back pain aggravated with movement./Chronically present for 12 months.)      Assessment & Plan   1. Other fatigue  -     Comprehensive Metabolic Panel; Future  -     Hemoglobin A1C; Future  -     Urinalysis with Reflex to Culture; Future  -     TSH with Reflex to FT4 ([place] Only); Future  -     Vitamin B12 & Folate; Future  -     CBC with Auto Differential; Future  -     Ferritin; Future  -     Iron and TIBC; Future  2. Hyperglycemia  -     Comprehensive Metabolic Panel; Future  -     Hemoglobin A1C; Future  -     Urinalysis with Reflex to Culture; Future  -     TSH with Reflex to FT4 ([place] Only); Future  -     Vitamin B12 & Folate; Future  -     CBC with Auto Differential; Future  3. Upper back pain on right side  -     XR THORACIC SPINE (3 VIEWS); Future  -     [facility] [place] ([facility])-OSR  4. Epigastric pain  Likely secondary to gastritis  Start Prilosec  -     Lipase; Future  5. Blurry vision  She will get eye exam from optometry    Return in about 4 weeks (around 2024) for epigastric pain.       Subjective   HPI    History of gestational dm, was checking BS after delivery and was ok.  But now having double vision and blurry vision and very tired, burning sensation in legs and tingling in feet. Chronic upper back pain and whenever she moves arms its so painful sharp pain, feels like muscles are tearing, no neck pain.  No increase in urination but does have increase in thirst.     Also after eating will get pain in upper abdomen, 80% of the time this will happen.     Review of Systems   All other systems reviewed and are negative.         Objective   Physical

## 2024-06-19 LAB
EST. AVERAGE GLUCOSE BLD GHB EST-MCNC: 131.2 MG/DL
HBA1C MFR BLD: 6.2 %

## 2024-06-24 ENCOUNTER — HOSPITAL ENCOUNTER (OUTPATIENT)
Dept: PHYSICAL THERAPY | Age: 29
Setting detail: THERAPIES SERIES
Discharge: HOME | End: 2024-06-24
Payer: MEDICAID

## 2024-06-24 PROCEDURE — 97161 PT EVAL LOW COMPLEX 20 MIN: CPT

## 2024-06-24 PROCEDURE — 97110 THERAPEUTIC EXERCISES: CPT

## 2024-06-24 NOTE — PLAN OF CARE
[facility] [address], [place] [address] office: [phone] fax: [phone]     Physical Therapy Initial Evaluation Certification      Dear Erika Prince MD,    We had the pleasure of evaluating the following patient for physical therapy services at [facility].  A summary of our findings can be found in the initial assessment below.  This includes our plan of care.  If you have any questions or concerns regarding these findings, please do not hesitate to contact me at the office phone number listed above.  Thank you for the referral.     Physician Signature:_______________________________Date:__________________  By signing above (or electronic signature), therapist’s plan is approved by physician       Physical Therapy: TREATMENT/PROGRESS NOTE   Patient: Robin Childress (29 y.o. female)   Examination Date: 2024   :  1995 MRN: [id number]   Visit #: 1   Insurance Allowable Auth Needed    []Yes    []No    Insurance: Payor[place] / Plan: [place] / Product Type: *No Product type* /   Insurance ID: [id number] - (Medicaid Managed)  Secondary Insurance (if applicable):    Treatment Diagnosis: ***  No diagnosis found.   Medical Diagnosis:  Upper back pain on right side [M54.9]   Referring Physician: Erika Prince MD  PCP: Erika Pricne MD     Plan of care signed (Y/N):     Date of Patient follow up with Physician:      Progress Report/POC: EVAL today  POC update due: (10 visits /OR AUTH LIMITS, whichever is less)  2024                                             Precautions/ Contra-indications:           Latex allergy:  NO  Pacemaker:    NO  Contraindications for Manipulation: None  Date of Surgery: n/a  Other:    Red Flags:  None    C-SSRS Triggered by Intake questionnaire:   Patient answered 'NO' to both behavioral questions on intake.  No further screening

## 2024-07-01 ENCOUNTER — APPOINTMENT (OUTPATIENT)
Dept: PHYSICAL THERAPY | Age: 29
End: 2024-07-01
Payer: MEDICAID

## 2024-07-08 ENCOUNTER — HOSPITAL ENCOUNTER (OUTPATIENT)
Dept: PHYSICAL THERAPY | Age: 29
Setting detail: THERAPIES SERIES
Discharge: HOME | End: 2024-07-08
Payer: MEDICAID

## 2024-07-08 DIAGNOSIS — M54.2 NECK PAIN: Primary | ICD-10-CM

## 2024-07-08 PROCEDURE — 97110 THERAPEUTIC EXERCISES: CPT

## 2024-07-08 PROCEDURE — 97140 MANUAL THERAPY 1/> REGIONS: CPT

## 2024-07-08 NOTE — FLOWSHEET NOTE
indicated to include: Passive Range of Motion, Gr I-IV mobilizations, Soft Tissue Mobilization, Trigger Point Release, and Myofascial Release  Modalities as needed that may include: Cryotherapy, Electrical Stimulation, and Thermal Agents  Patient education on postural re-education, activity modification, and progression of HEP    Plan: Cont POC- Continue emphasis/focus on exercise progression, improving proper muscle recruitment and activation/motor control patterns, and modulating pain. Next visit plan to progress weights, progress reps, and add new exercises     Electronically Signed by Eva Penn, PT  Date: 07/08/2024     Note: Portions of this note have been templated and/or copied from initial evaluation, reassessments and prior notes for documentation efficiency.    Note: If patient does not return for scheduled/recommended follow up visits, this note will serve as a discharge from care along with the most recent update on progress.

## 2024-07-15 RX ORDER — OMEPRAZOLE 20 MG/1
20 CAPSULE, DELAYED RELEASE ORAL
Qty: 30 CAPSULE | Refills: 0 | Status: SHIPPED | OUTPATIENT
Start: 2024-07-15

## 2024-07-15 NOTE — TELEPHONE ENCOUNTER
Medication:   Requested Prescriptions     Pending Prescriptions Disp Refills    omeprazole (PRILOSEC) 20 MG delayed release capsule [Pharmacy Med Name: OMEPRAZOLE DR 20 MG CAPSULE] 30 capsule 0     Sig: TAKE 1 CAPSULE BY MOUTH EVERY MORNING BEFORE BREAKFAST     Last Filled:  06/18/2024    Last appt: 6/18/2024   Next appt: Visit date not found    Last OARRS:        No data to display

## 2024-07-17 ENCOUNTER — HOSPITAL ENCOUNTER (OUTPATIENT)
Dept: PHYSICAL THERAPY | Age: 29
Setting detail: THERAPIES SERIES
Discharge: HOME | End: 2024-07-17
Payer: MEDICAID

## 2024-07-17 PROCEDURE — 97110 THERAPEUTIC EXERCISES: CPT

## 2024-07-17 PROCEDURE — 97140 MANUAL THERAPY 1/> REGIONS: CPT

## 2024-07-17 NOTE — FLOWSHEET NOTE
interventions:    Interventions:  Therapeutic Exercise (74768) including: strength training, ROM, and functional mobility  Neuromuscular Re-education (71747) activation and proprioception, including postural re-education.    Manual Therapy (79937) as indicated to include: Passive Range of Motion, Gr I-IV mobilizations, Soft Tissue Mobilization, Trigger Point Release, and Myofascial Release  Modalities as needed that may include: Cryotherapy, Electrical Stimulation, and Thermal Agents  Patient education on postural re-education, activity modification, and progression of HEP    Plan: Cont POC- Continue emphasis/focus on exercise progression, improving proper muscle recruitment and activation/motor control patterns, and modulating pain. Next visit plan to progress weights, progress reps, and add new exercises     Electronically Signed by Eva Penn, EFREN  Date: 07/17/2024     Note: Portions of this note have been templated and/or copied from initial evaluation, reassessments and prior notes for documentation efficiency.    Note: If patient does not return for scheduled/recommended follow up visits, this note will serve as a discharge from care along with the most recent update on progress.

## 2024-07-24 ENCOUNTER — APPOINTMENT (OUTPATIENT)
Dept: PHYSICAL THERAPY | Age: 29
End: 2024-07-24
Payer: MEDICAID

## 2024-08-21 RX ORDER — OMEPRAZOLE 20 MG/1
20 CAPSULE, DELAYED RELEASE ORAL
Qty: 30 CAPSULE | Refills: 0 | Status: SHIPPED | OUTPATIENT
Start: 2024-08-21

## 2024-08-21 NOTE — TELEPHONE ENCOUNTER
Medication:   Requested Prescriptions     Pending Prescriptions Disp Refills    omeprazole (PRILOSEC) 20 MG delayed release capsule [Pharmacy Med Name: OMEPRAZOLE DR 20 MG CAPSULE] 30 capsule 0     Sig: TAKE 1 CAPSULE BY MOUTH EVERY DAY IN THE MORNING BEFORE BREAKFAST     Last Filled:  07/15/2024    Last appt: 6/18/2024   Next appt: Visit date not found    Last OARRS:        No data to display

## 2024-12-22 ENCOUNTER — HOSPITAL ENCOUNTER (EMERGENCY)
Age: 29
Discharge: HOME | End: 2024-12-22
Payer: MEDICAID

## 2024-12-22 VITALS
BODY MASS INDEX: 38.45 KG/M2 | SYSTOLIC BLOOD PRESSURE: 158 MMHG | HEART RATE: 88 BPM | WEIGHT: 245 LBS | HEIGHT: 67 IN | DIASTOLIC BLOOD PRESSURE: 79 MMHG | TEMPERATURE: 98.2 F | OXYGEN SATURATION: 100 % | RESPIRATION RATE: 16 BRPM

## 2024-12-22 DIAGNOSIS — S61.213A LACERATION OF LEFT MIDDLE FINGER WITHOUT FOREIGN BODY WITHOUT DAMAGE TO NAIL, INITIAL ENCOUNTER: Primary | ICD-10-CM

## 2024-12-22 PROCEDURE — 99284 EMERGENCY DEPT VISIT MOD MDM: CPT

## 2024-12-22 PROCEDURE — 6360000002 HC RX W HCPCS: Performed by: PHYSICIAN ASSISTANT

## 2024-12-22 PROCEDURE — 90471 IMMUNIZATION ADMIN: CPT | Performed by: PHYSICIAN ASSISTANT

## 2024-12-22 PROCEDURE — 90714 TD VACC NO PRESV 7 YRS+ IM: CPT | Performed by: PHYSICIAN ASSISTANT

## 2024-12-22 PROCEDURE — 12041 INTMD RPR N-HF/GENIT 2.5CM/<: CPT

## 2024-12-22 RX ADMIN — CLOSTRIDIUM TETANI TOXOID ANTIGEN (FORMALDEHYDE INACTIVATED) AND CORYNEBACTERIUM DIPHTHERIAE TOXOID ANTIGEN (FORMALDEHYDE INACTIVATED) 0.5 ML: 5; 2 INJECTION, SUSPENSION INTRAMUSCULAR at 15:49

## 2024-12-22 ASSESSMENT — PAIN DESCRIPTION - LOCATION: LOCATION: HAND

## 2024-12-22 ASSESSMENT — PAIN DESCRIPTION - ORIENTATION: ORIENTATION: LEFT

## 2024-12-22 ASSESSMENT — PAIN - FUNCTIONAL ASSESSMENT: PAIN_FUNCTIONAL_ASSESSMENT: 0-10

## 2024-12-22 ASSESSMENT — PAIN SCALES - GENERAL: PAINLEVEL_OUTOF10: 10

## 2024-12-22 NOTE — ED PROVIDER NOTES
[facility] EMERGENCY DEPARTMENT  EMERGENCY DEPARTMENT ENCOUNTER        Pt Name: Robin Childress  MRN: [id number]  Birthdate 1995  Date of evaluation: 2024  Provider: Austin Hudson PA-C  PCP: Stefanie Garcia MD  Note Started: 4:20 PM EST 24      GOLDEN. I have evaluated this patient.        CHIEF COMPLAINT       Chief Complaint   Patient presents with    Laceration     Pt reporting she was cooking and missed and cut her hand        HISTORY OF PRESENT ILLNESS: 1 or more Elements     History from : Patient    Limitations to history : None    Robin Childress is a 29 y.o. female who presents to the emergency department today stating just prior to arrival she accidentally cut her left middle finger with a knife in her kitchen while cutting food.  She states bleeding was controlled with direct pressure.  She denies numbness, tingling or weakness in the finger.  She is unsure of tetanus status.        Nursing Notes were all reviewed and agreed with or any disagreements were addressed in the HPI.    REVIEW OF SYSTEMS :      Review of Systems   Constitutional:  Negative for chills and fever.   Respiratory:  Negative for chest tightness and shortness of breath.    Cardiovascular:  Negative for chest pain.   Gastrointestinal:  Negative for abdominal pain, diarrhea, nausea and vomiting.   Genitourinary:  Negative for dysuria.   Musculoskeletal:  Negative for arthralgias.   Skin:  Positive for wound.   Neurological:  Negative for numbness.   All other systems reviewed and are negative.      Positives and Pertinent negatives as per HPI.     SURGICAL HISTORY     Past Surgical History:   Procedure Laterality Date     SECTION N/A 2022     SECTION performed by Hilary FAUSTIN MD at [facility] L&D [facility]       [facility]       Discharge Medication List as of 2024  3:58 PM        CONTINUE these medications which have NOT CHANGED    Details   omeprazole

## 2024-12-22 NOTE — DISCHARGE INSTRUCTIONS
Sutures out in 10 to 12 days.    WOUND INSTRUCTIONS   LEAVE DRESSING ON FOR 48 HOURS THEN OK TO WASH AND GET WET WITH DAILY BATHING.  PLACE ANTIBIOTIC OINTMENT ON WOUND AND KEEP COVERED WITH BANDAGE.  REPEAT DAILY    How to Care for Your Stitches, Staples   or Skin Adhesive Strips      Stitches (sutures), staples and skin adhesive strips hold the skin together as it heals.   HOME CARE     Ø Wash your hands with soap and water before you touch your wound.  Ø Only use medicine or ointment ordered by your doctor.  Ø Change the dressing daily.   Ø Cover your wound and keep covered.  Ø Wash your hands again after touching your wound.  Ø Do not get your stitches dirty. If they get dirty, dab them gently with a clean washcloth. Wet the washcloth with soapy water. Do not rub. Pat them dry gently.  Ø Do not take out your own stitches or staples. Your skin adhesive strips will fall off by themselves. Skin adhesive strips usually fall off within 7 days. Do not pick at the wound. Picking can cause an infection.  Ø Do not miss your follow-up appointment.  Ø If you have problems or questions, call your:  l Doctor.  l Clinic.  l Nurse where you were a patient.     GET HELP RIGHT AWAY IF:     Ø Fever over 101º F (38 º C) develops.  Ø Chills develop.  Ø You have redness or pain around your stitches.  Ø There is swelling around your stitches.  Ø You notice drainage from your stitches.  Ø There is a foul smell from your wound.     Document Released: 10/15/2010  Document Re-Released: 01/09/2011  [employer]® Patient Information ©2011 [employer].

## 2024-12-31 ENCOUNTER — HOSPITAL ENCOUNTER (EMERGENCY)
Age: 29
Discharge: HOME | End: 2024-12-31
Payer: MEDICAID

## 2024-12-31 VITALS
WEIGHT: 245 LBS | HEART RATE: 78 BPM | BODY MASS INDEX: 37.13 KG/M2 | SYSTOLIC BLOOD PRESSURE: 153 MMHG | OXYGEN SATURATION: 99 % | DIASTOLIC BLOOD PRESSURE: 90 MMHG | TEMPERATURE: 98 F | HEIGHT: 68 IN | RESPIRATION RATE: 16 BRPM

## 2024-12-31 DIAGNOSIS — Z48.02 VISIT FOR SUTURE REMOVAL: ICD-10-CM

## 2024-12-31 DIAGNOSIS — Z51.89 VISIT FOR WOUND CHECK: Primary | ICD-10-CM

## 2024-12-31 PROCEDURE — 99282 EMERGENCY DEPT VISIT SF MDM: CPT

## 2024-12-31 ASSESSMENT — PAIN - FUNCTIONAL ASSESSMENT: PAIN_FUNCTIONAL_ASSESSMENT: NONE - DENIES PAIN

## 2024-12-31 ASSESSMENT — ENCOUNTER SYMPTOMS
SORE THROAT: 0
SHORTNESS OF BREATH: 0
COUGH: 0
EYE ITCHING: 0
ABDOMINAL PAIN: 0
STRIDOR: 0
VOMITING: 0
BACK PAIN: 0
EYE DISCHARGE: 0
EYE REDNESS: 0
NAUSEA: 0
DIARRHEA: 0
RHINORRHEA: 0

## 2025-01-01 NOTE — ED PROVIDER NOTES
[facility] EMERGENCY DEPARTMENT  EMERGENCY DEPARTMENT ENCOUNTER        Pt Name: Robin Childress  MRN: [id number]  Birthdate 1995  Date of evaluation: 12/31/2024  Provider: ENID Moraes  PCP: Stefanie Garcia MD  Note Started: 11:26 PM EST 12/31/24      GOLDEN. I have evaluated this patient.        CHIEF COMPLAINT       Chief Complaint   Patient presents with    Suture / Staple Removal     Sutre removal from left middle finger.        HISTORY OF PRESENT ILLNESS: 1 or more Elements     History From: Patient  Limitations to history : None    Robin Childress is a 29 y.o. female who presents to the emergency department for suture removal.  Patient was seen here 12/22/2024 after sustaining a laceration to her left middle finger.  She received 3 sutures.  She is here for suture removal.  She states the area is still somewhat painful, but not red, swollen or draining pus.  She denies fever.  She reports some numbness distal to the laceration which has persisted since the initial injury.  She denies new injury.  No other acute complaints.    Nursing Notes were all reviewed and agreed with or any disagreements were addressed in the HPI.    REVIEW OF SYSTEMS :      Review of Systems   Constitutional:  Negative for chills, diaphoresis and fever.   HENT:  Negative for congestion, rhinorrhea and sore throat.    Eyes:  Negative for discharge, redness and itching.   Respiratory:  Negative for cough, shortness of breath and stridor.    Cardiovascular:  Negative for chest pain and palpitations.   Gastrointestinal:  Negative for abdominal pain, diarrhea, nausea and vomiting.   Genitourinary:  Negative for dysuria and hematuria.   Musculoskeletal:  Negative for back pain and joint swelling.   Skin:  Positive for wound. Negative for rash.   Neurological:  Negative for syncope, speech difficulty and headaches.       Positives and Pertinent negatives as per HPI.     SURGICAL HISTORY     Past Surgical

## (undated) DEVICE — NON-ADHERENT PAD: Brand: TELFA

## (undated) DEVICE — 3M™ STERI-STRIP™ REINFORCED ADHESIVE SKIN CLOSURES, R1549, 1/2 IN X 2 IN (12 MM X 50 MM), 6 STRIPS/ENVELOPE: Brand: 3M™ STERI-STRIP™

## (undated) DEVICE — APPLICATOR MEDICATED 26 CC SOLUTION HI LT ORNG CHLORAPREP

## (undated) DEVICE — TRAY URIN CATH 16FR DRNGE BG STATLOK STBL DEV F SURSTP

## (undated) DEVICE — GARMENT,MEDLINE,DVT,INT,CALF,MED, GEN2: Brand: MEDLINE

## (undated) DEVICE — SUTURE COAT VCRL SZ 0 L36IN ABSRB VLT CTX L48MM TAPERPOINT J370H

## (undated) DEVICE — BLADE CLIPPER GEN PURP NS

## (undated) DEVICE — PENCIL SMK EVAC L10FT TBNG NONSTICK ESU BLDE PLUMEPEN ELITE

## (undated) DEVICE — PACK PROCEDURE SURG C SECT REV 1

## (undated) DEVICE — GLOVE SURG SZ 65 THK91MIL LTX FREE SYN POLYISOPRENE

## (undated) DEVICE — 9165 UNIVERSAL PATIENT PLATE: Brand: 3M™

## (undated) DEVICE — 3M™ STERI-STRIP™ COMPOUND BENZOIN TINCTURE 40 BAGS/CARTON 4 CARTONS/CASE C1544: Brand: 3M™ STERI-STRIP™

## (undated) DEVICE — 50" SINGLE PATIENT USE HOVERMATT: Brand: SINGLE PATIENT USE HOVERMATT

## (undated) DEVICE — WOUND RETRACTOR AND PROTECTOR: Brand: ALEXIS WOUND PROTECTOR-RETRACTOR

## (undated) DEVICE — SUTURE VCRL SZ 2-0 L36IN ABSRB UD L36MM CT-1 1/2 CIR J945H

## (undated) DEVICE — BANDAGE COMPR W3INXL5YD COT ZN OXIDE TAN E ADH HVYWT

## (undated) DEVICE — CLOTH SURG PREP PREOPERATIVE CHLORHEXIDINE GLUC 2% READYPREP